# Patient Record
Sex: FEMALE | Race: WHITE | NOT HISPANIC OR LATINO | Employment: STUDENT
[De-identification: names, ages, dates, MRNs, and addresses within clinical notes are randomized per-mention and may not be internally consistent; named-entity substitution may affect disease eponyms.]

---

## 2017-07-15 ENCOUNTER — HEALTH MAINTENANCE LETTER (OUTPATIENT)
Age: 27
End: 2017-07-15

## 2021-02-19 ENCOUNTER — E-VISIT (OUTPATIENT)
Dept: URGENT CARE | Facility: URGENT CARE | Age: 31
End: 2021-02-19
Payer: COMMERCIAL

## 2021-02-19 DIAGNOSIS — Z20.822 ENCOUNTER FOR LABORATORY TESTING FOR COVID-19 VIRUS: Primary | ICD-10-CM

## 2021-02-19 PROCEDURE — 99207 PR NON-BILLABLE SERV PER CHARTING: CPT | Performed by: FAMILY MEDICINE

## 2021-03-10 ENCOUNTER — OFFICE VISIT (OUTPATIENT)
Dept: LAB | Facility: CLINIC | Age: 31
End: 2021-03-10
Attending: FAMILY MEDICINE
Payer: COMMERCIAL

## 2021-03-10 DIAGNOSIS — Z20.822 ENCOUNTER FOR LABORATORY TESTING FOR COVID-19 VIRUS: ICD-10-CM

## 2021-03-10 LAB
SARS-COV-2 RNA RESP QL NAA+PROBE: NORMAL
SPECIMEN SOURCE: NORMAL

## 2021-03-10 PROCEDURE — U0005 INFEC AGEN DETEC AMPLI PROBE: HCPCS | Performed by: FAMILY MEDICINE

## 2021-03-10 PROCEDURE — U0003 INFECTIOUS AGENT DETECTION BY NUCLEIC ACID (DNA OR RNA); SEVERE ACUTE RESPIRATORY SYNDROME CORONAVIRUS 2 (SARS-COV-2) (CORONAVIRUS DISEASE [COVID-19]), AMPLIFIED PROBE TECHNIQUE, MAKING USE OF HIGH THROUGHPUT TECHNOLOGIES AS DESCRIBED BY CMS-2020-01-R: HCPCS | Performed by: FAMILY MEDICINE

## 2021-03-11 LAB
LABORATORY COMMENT REPORT: NORMAL
SARS-COV-2 RNA RESP QL NAA+PROBE: NEGATIVE
SPECIMEN SOURCE: NORMAL

## 2021-04-03 ENCOUNTER — HEALTH MAINTENANCE LETTER (OUTPATIENT)
Age: 31
End: 2021-04-03

## 2021-04-20 NOTE — PROGRESS NOTES
Assessment & Plan     Morbid obesity (H)  Chronic, struggles with with most of her life.  Was started on Wellbutrin and Lexapro in the last year and has lost 25 pounds.  Is eating healthier and more active at current job.  Discussed nutrition, sleep and exercise as part of weight loss.  Also discussed medication management, which patient is interested in.  Will start phentermine and topiramate as well as get baseline laboratory work-up.  Also discussed nutrition referral, patient is agreeable.  Referral placed, should be receiving a phone call to set up.  Will recheck weight and medications in 1 month in office.  - phentermine (ADIPEX-P) 15 MG capsule; Take 1 capsule (15 mg) by mouth every morning  - topiramate (TOPAMAX) 25 MG tablet; Take 1 tablet (25 mg) by mouth daily for 7 days, THEN 2 tablets (50 mg) daily for 24 days.  - CBC with platelets  - TSH with free T4 reflex  - Comprehensive metabolic panel (BMP + Alb, Alk Phos, ALT, AST, Total. Bili, TP)  - Hemoglobin A1c  - SLEEP EVALUATION & MANAGEMENT REFERRAL - [facility] [phone] (Age 13 and up if over 100 lbs); Future  - NUTRITION REFERRAL    Mild major depression (H)  Chronic, just recently in the last year restarted on Wellbutrin and Lexapro.  Has had a symptoms of depression since adolescence.  Distant history of self-harm, none currently.  Doing well on medications.  We will continue without any changes.  - buPROPion (WELLBUTRIN XL) 150 MG 24 hr tablet; Take 1 tablet (150 mg) by mouth every morning  - escitalopram (LEXAPRO) 20 MG tablet; Take 1 tablet (20 mg) by mouth daily    Snoring  History of obesity and snoring.  Father and brother with history of sleep apnea.  Will have patient schedule sleep study.  - SLEEP EVALUATION & MANAGEMENT REFERRAL - [facility] [phone] (Age 13 and up if over 100 lbs); Future    CARDIOVASCULAR SCREENING; LDL GOAL LESS THAN 160  Screening, family history  "of coronary artery disease.  Check lipids today.  - Lipid Profile (Chol, Trig, HDL, LDL calc)             BMI:   Estimated body mass index is 60.6 kg/m  as calculated from the following:    Height as of this encounter: 1.721 m (5' 7.75\").    Weight as of this encounter: 179.4 kg (395 lb 9.6 oz).   Weight management plan: Discussed healthy diet and exercise guidelines Medication management and nutrition     See Patient Instructions    Return in about 1 month (around 2021) for Recheck.    Ivanna Kinney, DALLIN CNP  M [facility]    Phoebe Dennis is a 30 year old who presents for the following health issues     HPI     Concern - weight problem  Onset: since age 11  Description: obesity  Intensity: severe  Progression of Symptoms:  worsening  Accompanying Signs & Symptoms: snoring  Previous history of similar problem: 0  Precipitating factors:        Worsened by: 0  Alleviating factors:        Improved by: 0  Therapies tried and outcome: has not tried medication for weight    Puberty started gaining weight  Struggled with depression during this age as well, self-harm. Was bullied  Was getting treatment with Lexapro for depression during college, and then stopped.   Last year during the onset of COVID felt the depression coming back - through a virtual visit started Lexapro and Wellbutrin and hydroxyzine for sleep. Doing very well on this.   Felt Melatonin wasn't working  Quit job and slept better   New job does have some more activity otherwise not very physically active     Has lost about 25 lbs over the course of the year being on Wellbutrin   Making better choices about getting out of bed   Family history of weight issues  Has decreased bread and carbs in the house and eating better now living with mom and younger brother   Father  of CAD - 2 years ago. Was also obese   Sleeps soundly once to sleep, family states patient snores. Tired during the day         Review of Systems " "  Constitutional, HEENT, cardiovascular, pulmonary, gi and gu systems are negative, except as otherwise noted.      Objective    /76   Pulse 86   Temp 98.6  F (37  C)   Resp 24   Ht 1.721 m (5' 7.75\")   Wt (!) 179.4 kg (395 lb 9.6 oz)   LMP 04/12/2021   SpO2 97%   Breastfeeding No   BMI 60.60 kg/m    Body mass index is 60.6 kg/m .  Physical Exam   GENERAL APPEARANCE: healthy, alert and no distress  RESP: lungs clear to auscultation - no rales, rhonchi or wheezes  CV: regular rates and rhythm, normal S1 S2, no S3 or S4 and no murmur, click or rub  ABDOMEN: soft, nontender, without hepatosplenomegaly or masses, bowel sounds normal and obese  MS: extremities normal- no gross deformities noted  SKIN: no suspicious lesions or rashes  NEURO: Normal strength and tone, mentation intact and speech normal  PSYCH: mentation appears normal and affect normal/bright    Diagnostic Test Results:  Pending            "

## 2021-04-21 ENCOUNTER — OFFICE VISIT (OUTPATIENT)
Dept: FAMILY MEDICINE | Facility: CLINIC | Age: 31
End: 2021-04-21
Payer: COMMERCIAL

## 2021-04-21 VITALS
RESPIRATION RATE: 24 BRPM | OXYGEN SATURATION: 97 % | SYSTOLIC BLOOD PRESSURE: 126 MMHG | BODY MASS INDEX: 44.41 KG/M2 | HEIGHT: 68 IN | TEMPERATURE: 98.6 F | DIASTOLIC BLOOD PRESSURE: 76 MMHG | HEART RATE: 86 BPM | WEIGHT: 293 LBS

## 2021-04-21 DIAGNOSIS — R06.83 SNORING: ICD-10-CM

## 2021-04-21 DIAGNOSIS — F32.0 MILD MAJOR DEPRESSION (H): ICD-10-CM

## 2021-04-21 DIAGNOSIS — E66.01 MORBID OBESITY (H): Primary | ICD-10-CM

## 2021-04-21 DIAGNOSIS — Z13.6 CARDIOVASCULAR SCREENING; LDL GOAL LESS THAN 160: ICD-10-CM

## 2021-04-21 LAB
ALBUMIN SERPL-MCNC: 3.5 G/DL (ref 3.4–5)
ALP SERPL-CCNC: 95 U/L (ref 40–150)
ALT SERPL W P-5'-P-CCNC: 23 U/L (ref 0–50)
ANION GAP SERPL CALCULATED.3IONS-SCNC: 5 MMOL/L (ref 3–14)
AST SERPL W P-5'-P-CCNC: 11 U/L (ref 0–45)
BILIRUB SERPL-MCNC: 1.1 MG/DL (ref 0.2–1.3)
BUN SERPL-MCNC: 12 MG/DL (ref 7–30)
CALCIUM SERPL-MCNC: 8.6 MG/DL (ref 8.5–10.1)
CHLORIDE SERPL-SCNC: 104 MMOL/L (ref 94–109)
CHOLEST SERPL-MCNC: 146 MG/DL
CO2 SERPL-SCNC: 27 MMOL/L (ref 20–32)
CREAT SERPL-MCNC: 0.97 MG/DL (ref 0.52–1.04)
ERYTHROCYTE [DISTWIDTH] IN BLOOD BY AUTOMATED COUNT: 14.2 % (ref 10–15)
GFR SERPL CREATININE-BSD FRML MDRD: 77 ML/MIN/{1.73_M2}
GLUCOSE SERPL-MCNC: 92 MG/DL (ref 70–99)
HBA1C MFR BLD: 5.3 % (ref 0–5.6)
HCT VFR BLD AUTO: 40.3 % (ref 35–47)
HDLC SERPL-MCNC: 39 MG/DL
HGB BLD-MCNC: 13 G/DL (ref 11.7–15.7)
LDLC SERPL CALC-MCNC: 88 MG/DL
MCH RBC QN AUTO: 28.5 PG (ref 26.5–33)
MCHC RBC AUTO-ENTMCNC: 32.3 G/DL (ref 31.5–36.5)
MCV RBC AUTO: 88 FL (ref 78–100)
NONHDLC SERPL-MCNC: 107 MG/DL
PLATELET # BLD AUTO: 367 10E9/L (ref 150–450)
POTASSIUM SERPL-SCNC: 4.1 MMOL/L (ref 3.4–5.3)
PROT SERPL-MCNC: 7.5 G/DL (ref 6.8–8.8)
RBC # BLD AUTO: 4.56 10E12/L (ref 3.8–5.2)
SODIUM SERPL-SCNC: 136 MMOL/L (ref 133–144)
TRIGL SERPL-MCNC: 94 MG/DL
TSH SERPL DL<=0.005 MIU/L-ACNC: 0.77 MU/L (ref 0.4–4)
WBC # BLD AUTO: 9.7 10E9/L (ref 4–11)

## 2021-04-21 PROCEDURE — 80061 LIPID PANEL: CPT | Performed by: NURSE PRACTITIONER

## 2021-04-21 PROCEDURE — 85027 COMPLETE CBC AUTOMATED: CPT | Performed by: NURSE PRACTITIONER

## 2021-04-21 PROCEDURE — 83036 HEMOGLOBIN GLYCOSYLATED A1C: CPT | Performed by: NURSE PRACTITIONER

## 2021-04-21 PROCEDURE — 84443 ASSAY THYROID STIM HORMONE: CPT | Performed by: NURSE PRACTITIONER

## 2021-04-21 PROCEDURE — 36415 COLL VENOUS BLD VENIPUNCTURE: CPT | Performed by: NURSE PRACTITIONER

## 2021-04-21 PROCEDURE — 80053 COMPREHEN METABOLIC PANEL: CPT | Performed by: NURSE PRACTITIONER

## 2021-04-21 PROCEDURE — 99204 OFFICE O/P NEW MOD 45 MIN: CPT | Performed by: NURSE PRACTITIONER

## 2021-04-21 RX ORDER — ESCITALOPRAM OXALATE 20 MG/1
20 TABLET ORAL DAILY
Qty: 90 TABLET | Refills: 3 | Status: SHIPPED | OUTPATIENT
Start: 2021-04-21 | End: 2022-06-08

## 2021-04-21 RX ORDER — ESCITALOPRAM OXALATE 20 MG/1
20 TABLET ORAL DAILY
COMMUNITY
Start: 2021-03-31 | End: 2021-04-21

## 2021-04-21 RX ORDER — BUPROPION HYDROCHLORIDE 150 MG/1
150 TABLET ORAL EVERY MORNING
Qty: 90 TABLET | Refills: 3 | Status: SHIPPED | OUTPATIENT
Start: 2021-04-21 | End: 2022-04-27

## 2021-04-21 RX ORDER — TOPIRAMATE 25 MG/1
TABLET, FILM COATED ORAL
Qty: 55 TABLET | Refills: 0 | Status: SHIPPED | OUTPATIENT
Start: 2021-04-21 | End: 2021-05-21

## 2021-04-21 RX ORDER — PHENTERMINE HYDROCHLORIDE 15 MG/1
15 CAPSULE ORAL EVERY MORNING
Qty: 31 CAPSULE | Refills: 0 | Status: SHIPPED | OUTPATIENT
Start: 2021-04-21 | End: 2021-05-21

## 2021-04-21 RX ORDER — BUPROPION HYDROCHLORIDE 150 MG/1
TABLET ORAL DAILY
COMMUNITY
Start: 2020-06-01 | End: 2021-04-21

## 2021-04-21 ASSESSMENT — MIFFLIN-ST. JEOR: SCORE: 2548.96

## 2021-04-21 NOTE — PATIENT INSTRUCTIONS
Morbid obesity (H)  Chronic, struggles with with most of her life.  Was started on Wellbutrin and Lexapro in the last year and has lost 25 pounds.  Is eating healthier and more active at current job.  Discussed nutrition, sleep and exercise as part of weight loss.  Also discussed medication management, which patient is interested in.  Will start phentermine and topiramate as well as get baseline laboratory work-up.  Also discussed nutrition referral, patient is agreeable.  Referral placed, should be receiving a phone call to set up.  Will recheck weight and medications in 1 month in office.  - phentermine (ADIPEX-P) 15 MG capsule; Take 1 capsule (15 mg) by mouth every morning  - topiramate (TOPAMAX) 25 MG tablet; Take 1 tablet (25 mg) by mouth daily for 7 days, THEN 2 tablets (50 mg) daily for 24 days.  - CBC with platelets  - TSH with free T4 reflex  - Comprehensive metabolic panel (BMP + Alb, Alk Phos, ALT, AST, Total. Bili, TP)  - Hemoglobin A1c  - SLEEP EVALUATION & MANAGEMENT REFERRAL - [facility] [phone] (Age 13 and up if over 100 lbs); Future  - NUTRITION REFERRAL    Mild major depression (H)  Chronic, just recently in the last year restarted on Wellbutrin and Lexapro.  Has had a symptoms of depression since adolescence.  Distant history of self-harm, none currently.  Doing well on medications.  We will continue without any changes.  - buPROPion (WELLBUTRIN XL) 150 MG 24 hr tablet; Take 1 tablet (150 mg) by mouth every morning  - escitalopram (LEXAPRO) 20 MG tablet; Take 1 tablet (20 mg) by mouth daily    Snoring  History of obesity and snoring.  Father and brother with history of sleep apnea.  Will have patient schedule sleep study.  - SLEEP EVALUATION & MANAGEMENT REFERRAL - [facility] [phone] (Age 13 and up if over 100 lbs); Future    CARDIOVASCULAR SCREENING; LDL GOAL LESS THAN 160  Screening, family history of coronary artery  disease.  Check lipids today.  - Lipid Profile (Chol, Trig, HDL, LDL calc)

## 2021-05-21 ENCOUNTER — OFFICE VISIT (OUTPATIENT)
Dept: FAMILY MEDICINE | Facility: CLINIC | Age: 31
End: 2021-05-21
Payer: COMMERCIAL

## 2021-05-21 VITALS
WEIGHT: 293 LBS | SYSTOLIC BLOOD PRESSURE: 136 MMHG | TEMPERATURE: 98.9 F | RESPIRATION RATE: 30 BRPM | OXYGEN SATURATION: 98 % | DIASTOLIC BLOOD PRESSURE: 82 MMHG | HEART RATE: 86 BPM | BODY MASS INDEX: 58.36 KG/M2

## 2021-05-21 DIAGNOSIS — Z00.00 ROUTINE GENERAL MEDICAL EXAMINATION AT A HEALTH CARE FACILITY: Primary | ICD-10-CM

## 2021-05-21 DIAGNOSIS — E66.01 MORBID OBESITY (H): ICD-10-CM

## 2021-05-21 PROCEDURE — 99395 PREV VISIT EST AGE 18-39: CPT | Performed by: NURSE PRACTITIONER

## 2021-05-21 PROCEDURE — G0145 SCR C/V CYTO,THINLAYER,RESCR: HCPCS | Performed by: NURSE PRACTITIONER

## 2021-05-21 PROCEDURE — 87624 HPV HI-RISK TYP POOLED RSLT: CPT | Performed by: NURSE PRACTITIONER

## 2021-05-21 RX ORDER — TOPIRAMATE 50 MG/1
50 TABLET, FILM COATED ORAL DAILY
Qty: 60 TABLET | Refills: 0 | Status: SHIPPED | OUTPATIENT
Start: 2021-05-21 | End: 2021-06-30

## 2021-05-21 RX ORDER — PHENTERMINE HYDROCHLORIDE 15 MG/1
15 CAPSULE ORAL EVERY MORNING
Qty: 31 CAPSULE | Refills: 0 | Status: SHIPPED | OUTPATIENT
Start: 2021-05-21 | End: 2021-06-30

## 2021-05-21 ASSESSMENT — ENCOUNTER SYMPTOMS
BREAST MASS: 0
FEVER: 0
CHILLS: 0
DIZZINESS: 0
MYALGIAS: 0
PALPITATIONS: 0
FREQUENCY: 0
SORE THROAT: 0
JOINT SWELLING: 0
ARTHRALGIAS: 0
PARESTHESIAS: 0
DIARRHEA: 0
WEAKNESS: 0
ABDOMINAL PAIN: 0
DYSURIA: 0
HEMATURIA: 0
EYE PAIN: 0
HEARTBURN: 0
NERVOUS/ANXIOUS: 0
CONSTIPATION: 0
HEADACHES: 0
SHORTNESS OF BREATH: 0
HEMATOCHEZIA: 0
NAUSEA: 0
COUGH: 0

## 2021-05-21 ASSESSMENT — PATIENT HEALTH QUESTIONNAIRE - PHQ9
SUM OF ALL RESPONSES TO PHQ QUESTIONS 1-9: 2
SUM OF ALL RESPONSES TO PHQ QUESTIONS 1-9: 2

## 2021-05-21 NOTE — PATIENT INSTRUCTIONS
1. Routine general medical examination at a health care facility    - Pap imaged thin layer screen with HPV - recommended age 30 - 65  - HPV High Risk Types DNA Cervical    2. Morbid obesity (H)  Chronic, started on Phentermine and Topiramate 1 month ago. Down 15 lbs. Doing well. Meets with dietician on June 9th. Hasn't started any exercise program yet, would encourage to start something, even if small walks. Follow-up in 1 month for recheck.   - phentermine (ADIPEX-P) 15 MG capsule; Take 1 capsule (15 mg) by mouth every morning  Dispense: 31 capsule; Refill: 0  - topiramate (TOPAMAX) 50 MG tablet; Take 1 tablet (50 mg) by mouth daily  Dispense: 60 tablet; Refill: 0        Preventive Health Recommendations  Female Ages 26 - 39  Yearly exam:   See your health care provider every year in order to    Review health changes.     Discuss preventive care.      Review your medicines if you your doctor has prescribed any.    Until age 30: Get a Pap test every three years (more often if you have had an abnormal result).    After age 30: Talk to your doctor about whether you should have a Pap test every 3 years or have a Pap test with HPV screening every 5 years.   You do not need a Pap test if your uterus was removed (hysterectomy) and you have not had cancer.  You should be tested each year for STDs (sexually transmitted diseases), if you're at risk.   Talk to your provider about how often to have your cholesterol checked.  If you are at risk for diabetes, you should have a diabetes test (fasting glucose).  Shots: Get a flu shot each year. Get a tetanus shot every 10 years.   Nutrition:     Eat at least 5 servings of fruits and vegetables each day.    Eat whole-grain bread, whole-wheat pasta and brown rice instead of white grains and rice.    Get adequate Calcium and Vitamin D.     Lifestyle    Exercise at least 150 minutes a week (30 minutes a day, 5 days of the week). This will help you control your weight and prevent  disease.    Limit alcohol to one drink per day.    No smoking.     Wear sunscreen to prevent skin cancer.    See your dentist every six months for an exam and cleaning.

## 2021-05-21 NOTE — PROGRESS NOTES
SUBJECTIVE:   CC: Jeannie Russo is an 30 year old woman who presents for preventive health visit.       Patient has been advised of split billing requirements and indicates understanding: Yes  Healthy Habits:     Getting at least 3 servings of Calcium per day:  Yes    Bi-annual eye exam:  Yes    Dental care twice a year:  NO    Sleep apnea or symptoms of sleep apnea:  None    Diet:  Carbohydrate counting    Frequency of exercise:  None    Taking medications regularly:  Yes    Medication side effects:  Lightheadedness    PHQ-2 Total Score: 0    Additional concerns today:  No      Weight Loss    On Phentermine and Topamax - tolerating well  Had difficulty with evening binging -  Doing very well with this.     Wt Readings from Last 3 Encounters:   05/21/21 (!) 172.8 kg (381 lb)   04/21/21 (!) 179.4 kg (395 lb 9.6 oz)   05/26/06 102.5 kg (225 lb 14.4 oz) (99 %, Z= 2.27)*     * Growth percentiles are based on [facility] (Girls, 2-20 Years) data.          Today's PHQ-2 Score:   PHQ-2 ( 1999 [employer]) 5/21/2021   Q1: Little interest or pleasure in doing things 0   Q2: Feeling down, depressed or hopeless 0   PHQ-2 Score 0   Q1: Little interest or pleasure in doing things Not at all   Q2: Feeling down, depressed or hopeless Not at all   PHQ-2 Score 0       Abuse: Current or Past (Physical, Sexual or Emotional) - No  Do you feel safe in your environment? Yes    Have you ever done Advance Care Planning? (For example, a Health Directive, POLST, or a discussion with a medical provider or your loved ones about your wishes): No, advance care planning information given to patient to review.  Patient declined advance care planning discussion at this time.    Social History     Tobacco Use     Smoking status: Never Smoker     Smokeless tobacco: Never Used   Substance Use Topics     Alcohol use: No         Alcohol Use 5/21/2021   Prescreen: >3 drinks/day or >7 drinks/week? No       Reviewed orders with patient.  Reviewed health  maintenance and updated orders accordingly - Yes  Lab work is in process  Labs reviewed in EPIC  BP Readings from Last 3 Encounters:   05/21/21 136/82   04/21/21 126/76   05/26/06 133/69 (98 %, Z = 2.01 /  60 %, Z = 0.24)*     *BP percentiles are based on the 2017 AAP Clinical Practice Guideline for girls    Wt Readings from Last 3 Encounters:   05/21/21 (!) 172.8 kg (381 lb)   04/21/21 (!) 179.4 kg (395 lb 9.6 oz)   05/26/06 102.5 kg (225 lb 14.4 oz) (99 %, Z= 2.27)*     * Growth percentiles are based on [facility] (Girls, 2-20 Years) data.                  Patient Active Problem List   Diagnosis     Morbid obesity (H)     Mild major depression (H)     Past Surgical History:   Procedure Laterality Date     SURGICAL HISTORY OF -       None       Social History     Tobacco Use     Smoking status: Never Smoker     Smokeless tobacco: Never Used   Substance Use Topics     Alcohol use: No     Family History   Problem Relation Age of Onset     C.A.D. Father      Diabetes Father      Hypertension Father      C.A.D. Paternal Grandfather      Breast Cancer Other      Breast Cancer Other      Musculoskeletal Disorder Other         MS         Current Outpatient Medications   Medication Sig Dispense Refill     buPROPion (WELLBUTRIN XL) 150 MG 24 hr tablet Take 1 tablet (150 mg) by mouth every morning 90 tablet 3     escitalopram (LEXAPRO) 20 MG tablet Take 1 tablet (20 mg) by mouth daily 90 tablet 3     phentermine (ADIPEX-P) 15 MG capsule Take 1 capsule (15 mg) by mouth every morning 31 capsule 0     topiramate (TOPAMAX) 50 MG tablet Take 1 tablet (50 mg) by mouth daily 60 tablet 0     No Known Allergies    Breast Cancer Screening:    Breast CA Risk Assessment (FHS-7) 5/21/2021   Do you have a family history of breast, colon, or ovarian cancer? No / Unknown       Patient under 40 years of age: Routine Mammogram Screening not recommended.   Pertinent mammograms are reviewed under the imaging tab.    History of abnormal Pap smear: Has  never had PAP      Reviewed and updated as needed this visit by clinical staff  Tobacco   Meds              Reviewed and updated as needed this visit by Provider    Meds             No past medical history on file.   Past Surgical History:   Procedure Laterality Date     SURGICAL HISTORY OF -       None       Review of Systems   Constitutional: Negative for chills and fever.   HENT: Negative for congestion, ear pain, hearing loss and sore throat.    Eyes: Negative for pain and visual disturbance.   Respiratory: Negative for cough and shortness of breath.    Cardiovascular: Negative for chest pain, palpitations and peripheral edema.   Gastrointestinal: Negative for abdominal pain, constipation, diarrhea, heartburn, hematochezia and nausea.   Breasts:  Negative for tenderness, breast mass and discharge.   Genitourinary: Negative for dysuria, frequency, genital sores, hematuria, pelvic pain, urgency, vaginal bleeding and vaginal discharge.   Musculoskeletal: Negative for arthralgias, joint swelling and myalgias.   Skin: Negative for rash.   Neurological: Negative for dizziness, weakness, headaches and paresthesias.   Psychiatric/Behavioral: Negative for mood changes. The patient is not nervous/anxious.         OBJECTIVE:   /82   Pulse 86   Temp 98.9  F (37.2  C)   Resp 30   Wt (!) 172.8 kg (381 lb)   LMP 05/17/2021   SpO2 98%   Breastfeeding No   BMI 58.36 kg/m    Physical Exam  GENERAL: healthy, alert and no distress  EYES: Eyes grossly normal to inspection, PERRL and conjunctivae and sclerae normal  HENT: ear canals and TM's normal, nose and mouth without ulcers or lesions  NECK: no adenopathy, no asymmetry, masses, or scars and thyroid normal to palpation  RESP: lungs clear to auscultation - no rales, rhonchi or wheezes  BREAST: normal without masses, tenderness or nipple discharge and no palpable axillary masses or adenopathy  CV: regular rate and rhythm, normal S1 S2, no S3 or S4, no murmur, click  "or rub, no peripheral edema and peripheral pulses strong  ABDOMEN: soft, nontender, no hepatosplenomegaly, no masses and bowel sounds normal  ABDOMEN: soft, nontender, without hepatosplenomegaly or masses, bowel sounds normal and obese  MS: no gross musculoskeletal defects noted, no edema  SKIN: no suspicious lesions or rashes  NEURO: Normal strength and tone, mentation intact and speech normal  PSYCH: mentation appears normal, affect normal/bright    Diagnostic Test Results:  Labs reviewed in Epic  Pending     ASSESSMENT/PLAN:   1. Routine general medical examination at a health care facility    - Pap imaged thin layer screen with HPV - recommended age 30 - 65  - HPV High Risk Types DNA Cervical    2. Morbid obesity (H)  Chronic, started on Phentermine and Topiramate 1 month ago. Down 15 lbs. Doing well. Meets with dietician on June 9th. Hasn't started any exercise program yet, would encourage to start something, even if small walks. Follow-up in 1 month for recheck.   - phentermine (ADIPEX-P) 15 MG capsule; Take 1 capsule (15 mg) by mouth every morning  Dispense: 31 capsule; Refill: 0  - topiramate (TOPAMAX) 50 MG tablet; Take 1 tablet (50 mg) by mouth daily  Dispense: 60 tablet; Refill: 0    Patient has been advised of split billing requirements and indicates understanding: Yes  COUNSELING:  Reviewed preventive health counseling, as reflected in patient instructions    Estimated body mass index is 58.36 kg/m  as calculated from the following:    Height as of 4/21/21: 1.721 m (5' 7.75\").    Weight as of this encounter: 172.8 kg (381 lb).    Weight management plan: Discussed healthy diet and exercise guidelines Patient also on medication management for weight loss    She reports that she has never smoked. She has never used smokeless tobacco.      Counseling Resources:  ATP IV Guidelines  Pooled Cohorts Equation Calculator  Breast Cancer Risk Calculator  BRCA-Related Cancer Risk Assessment: FHS-7 Tool  FRAX Risk " Assessment  ICSI Preventive Guidelines  Dietary Guidelines for Americans, 2010  USDA's MyPlate  ASA Prophylaxis  Lung CA Screening    DALLIN Richter CNP  M [facility]

## 2021-05-26 LAB
COPATH REPORT: NORMAL
PAP: NORMAL

## 2021-05-27 LAB
FINAL DIAGNOSIS: NORMAL
HPV HR 12 DNA CVX QL NAA+PROBE: NEGATIVE
HPV16 DNA SPEC QL NAA+PROBE: NEGATIVE
HPV18 DNA SPEC QL NAA+PROBE: NEGATIVE
SPECIMEN DESCRIPTION: NORMAL
SPECIMEN SOURCE CVX/VAG CYTO: NORMAL

## 2021-06-30 ENCOUNTER — OFFICE VISIT (OUTPATIENT)
Dept: FAMILY MEDICINE | Facility: CLINIC | Age: 31
End: 2021-06-30
Payer: COMMERCIAL

## 2021-06-30 VITALS
DIASTOLIC BLOOD PRESSURE: 80 MMHG | WEIGHT: 293 LBS | HEART RATE: 91 BPM | BODY MASS INDEX: 44.41 KG/M2 | RESPIRATION RATE: 14 BRPM | SYSTOLIC BLOOD PRESSURE: 128 MMHG | HEIGHT: 68 IN | OXYGEN SATURATION: 99 %

## 2021-06-30 DIAGNOSIS — E66.01 MORBID OBESITY (H): ICD-10-CM

## 2021-06-30 PROCEDURE — 99213 OFFICE O/P EST LOW 20 MIN: CPT | Performed by: NURSE PRACTITIONER

## 2021-06-30 RX ORDER — PHENTERMINE HYDROCHLORIDE 15 MG/1
15 CAPSULE ORAL EVERY MORNING
Qty: 31 CAPSULE | Refills: 0 | Status: SHIPPED | OUTPATIENT
Start: 2021-06-30 | End: 2021-07-21 | Stop reason: DRUGHIGH

## 2021-06-30 RX ORDER — TOPIRAMATE 50 MG/1
50 TABLET, FILM COATED ORAL DAILY
Qty: 30 TABLET | Refills: 0 | Status: SHIPPED | OUTPATIENT
Start: 2021-06-30 | End: 2021-07-21

## 2021-06-30 ASSESSMENT — MIFFLIN-ST. JEOR: SCORE: 2455.52

## 2021-06-30 NOTE — PROGRESS NOTES
"    Assessment & Plan     Morbid obesity (H)  Chronic, started phentermine and topiramate a little over 2 months ago.  Doing very well with weight loss, down 6 pounds since last office visit, total weight loss since start = 20 pounds.  Very happy with progress, keep up the good work.  Patient notes decrease in appetite.  Is joining the gym, and will be working on increasing physical activity.  Encouraged to continue a healthy diet and exercise and will have patient return to clinic in 1 month for recheck.  - phentermine (ADIPEX-P) 15 MG capsule; Take 1 capsule (15 mg) by mouth every morning  - topiramate (TOPAMAX) 50 MG tablet; Take 1 tablet (50 mg) by mouth daily           See Patient Instructions    Return in about 1 month (around 7/30/2021) for Recheck.    Ivanna Kinney, DALLIN [facility]    Phoebe Dennis is a 30 year old who presents for the following health issues:    HPI     Medication Followup of Weight (Phentermine/Topamax    Taking Medication as prescribed: yes    Side Effects:  None    Medication Helping Symptoms:  yes     Doing very well   Doesn't think about food as much, or what she's going to eat   Last couple of weeks has been traveling - lots of driving       Wt Readings from Last 4 Encounters:   06/30/21 (!) 170.1 kg (375 lb)   05/21/21 (!) 172.8 kg (381 lb)   04/21/21 (!) 179.4 kg (395 lb 9.6 oz)   05/26/06 102.5 kg (225 lb 14.4 oz) (99 %, Z= 2.27)*     * Growth percentiles are based on CDC (Girls, 2-20 Years) data.           Review of Systems   Constitutional, HEENT, cardiovascular, pulmonary, gi and gu systems are negative, except as otherwise noted.      Objective    /80 (BP Location: Right arm, Patient Position: Chair, Cuff Size: Adult Large)   Pulse 91   Resp 14   Ht 1.721 m (5' 7.75\")   Wt (!) 170.1 kg (375 lb)   SpO2 99%   BMI 57.44 kg/m    Body mass index is 57.44 kg/m .  Physical Exam   GENERAL APPEARANCE: healthy, alert and no " distress  RESP: lungs clear to auscultation - no rales, rhonchi or wheezes  CV: regular rates and rhythm, normal S1 S2, no S3 or S4 and no murmur, click or rub  ABDOMEN: soft, nontender, without hepatosplenomegaly or masses, bowel sounds normal and obese  NEURO: Normal strength and tone, mentation intact and speech normal  PSYCH: mentation appears normal and affect normal/bright    Diagnostic Test Results:  none

## 2021-06-30 NOTE — PATIENT INSTRUCTIONS
Morbid obesity (H)  Chronic, started phentermine and topiramate a little over 2 months ago.  Doing very well with weight loss, down 6 pounds since last office visit, total weight loss since start = 20 pounds.  Very happy with progress, keep up the good work.  Patient notes decrease in appetite.  Is joining the gym, and will be working on increasing physical activity.  Encouraged to continue a healthy diet and exercise and will have patient return to clinic in 1 month for recheck.  - phentermine (ADIPEX-P) 15 MG capsule; Take 1 capsule (15 mg) by mouth every morning  - topiramate (TOPAMAX) 50 MG tablet; Take 1 tablet (50 mg) by mouth daily

## 2021-07-21 ENCOUNTER — OFFICE VISIT (OUTPATIENT)
Dept: FAMILY MEDICINE | Facility: CLINIC | Age: 31
End: 2021-07-21
Payer: COMMERCIAL

## 2021-07-21 VITALS
SYSTOLIC BLOOD PRESSURE: 106 MMHG | OXYGEN SATURATION: 98 % | TEMPERATURE: 98.8 F | RESPIRATION RATE: 30 BRPM | DIASTOLIC BLOOD PRESSURE: 66 MMHG | WEIGHT: 293 LBS | BODY MASS INDEX: 56.77 KG/M2 | HEART RATE: 86 BPM

## 2021-07-21 DIAGNOSIS — E66.01 MORBID OBESITY (H): ICD-10-CM

## 2021-07-21 DIAGNOSIS — Z80.8 FAMILY HISTORY OF SKIN CANCER: Primary | ICD-10-CM

## 2021-07-21 PROCEDURE — 99213 OFFICE O/P EST LOW 20 MIN: CPT | Performed by: NURSE PRACTITIONER

## 2021-07-21 RX ORDER — TOPIRAMATE 50 MG/1
50 TABLET, FILM COATED ORAL 2 TIMES DAILY
Qty: 60 TABLET | Refills: 0 | Status: SHIPPED | OUTPATIENT
Start: 2021-07-21 | End: 2021-08-30

## 2021-07-21 RX ORDER — PHENTERMINE HYDROCHLORIDE 15 MG/1
15 CAPSULE ORAL EVERY MORNING
Qty: 31 CAPSULE | Refills: 0 | Status: CANCELLED | OUTPATIENT
Start: 2021-07-21

## 2021-07-21 RX ORDER — PHENTERMINE HYDROCHLORIDE 37.5 MG/1
37.5 TABLET ORAL
Qty: 30 TABLET | Refills: 0 | Status: SHIPPED | OUTPATIENT
Start: 2021-07-21 | End: 2021-08-30

## 2021-07-21 RX ORDER — TOPIRAMATE 50 MG/1
50 TABLET, FILM COATED ORAL DAILY
Qty: 30 TABLET | Refills: 0 | Status: CANCELLED | OUTPATIENT
Start: 2021-07-21

## 2021-07-21 NOTE — PROGRESS NOTES
Assessment & Plan     Morbid obesity (H)  Chronic, improving.  Down another 5 pounds since last office visit!  Congratulated patient!  Started NOOM, and the gym.  Doing very well.  Noticing appetite decrease not as good as it was.  Discussed increasing phentermine and topiramate, patient agreeable.  Will increase both and follow-up in 1 month in clinic.  Continue good nutritious diet and exercise.  - phentermine (ADIPEX-P) 37.5 MG tablet; Take 1 tablet (37.5 mg) by mouth every morning (before breakfast)  - topiramate (TOPAMAX) 50 MG tablet; Take 1 tablet (50 mg) by mouth 2 times daily    Family history of skin cancer  Mom has history of skin cancer, patient unsure which type.  Recommend full body skin check by dermatology.  Referral placed.  - Adult Dermatology Referral; Future             See Patient Instructions    Return in about 1 month (around 8/21/2021) for Recheck.    DALLIN Richter CNP  M [facility]    Phoebe Dennis is a 30 year old who presents for the following health issues     HPI     Medication Followup of phentermine and topamax    Taking Medication as prescribed: yes    Side Effects:  None    Medication Helping Symptoms:  yes     Wt Readings from Last 4 Encounters:   07/21/21 (!) 168.1 kg (370 lb 9.6 oz)   06/30/21 (!) 170.1 kg (375 lb)   05/21/21 (!) 172.8 kg (381 lb)   04/21/21 (!) 179.4 kg (395 lb 9.6 oz)     Did join gym -doing well with this  Started Noom 8 days ago  Appetite decrease not as much as it was at the beginning, eating nutritious foods        Review of Systems   Constitutional, HEENT, cardiovascular, pulmonary, gi and gu systems are negative, except as otherwise noted.      Objective    /66   Pulse 86   Temp 98.8  F (37.1  C)   Resp 30   Wt (!) 168.1 kg (370 lb 9.6 oz)   LMP 07/12/2021   SpO2 98%   Breastfeeding No   BMI 56.77 kg/m    Body mass index is 56.77 kg/m .  Physical Exam   GENERAL APPEARANCE: healthy, alert and no  distress  RESP: lungs clear to auscultation - no rales, rhonchi or wheezes  CV: regular rates and rhythm, normal S1 S2, no S3 or S4 and no murmur, click or rub  ABDOMEN: soft, nontender, without hepatosplenomegaly or masses and bowel sounds normal and obese  MS: extremities normal- no gross deformities noted  SKIN: no suspicious lesions or rashes  NEURO: Normal strength and tone, mentation intact and speech normal  PSYCH: mentation appears normal and affect normal/bright    Diagnostic Test Results:  none          Chart documentation with Dragon Voice recognition Software. Although reviewed after completion, some words and grammatical errors may remain.

## 2021-07-21 NOTE — PATIENT INSTRUCTIONS
Morbid obesity (H)  Chronic, improving.  Down another 5 pounds since last office visit!  Congratulated patient!  Started NOOM, and the gym.  Doing very well.  Noticing appetite decrease not as good as it was.  Discussed increasing phentermine and topiramate, patient agreeable.  Will increase both and follow-up in 1 month in clinic.  Continue good nutritious diet and exercise.  - phentermine (ADIPEX-P) 37.5 MG tablet; Take 1 tablet (37.5 mg) by mouth every morning (before breakfast)  - topiramate (TOPAMAX) 50 MG tablet; Take 1 tablet (50 mg) by mouth 2 times daily    Family history of skin cancer  Mom has history of skin cancer, patient unsure which type.  Recommend full body skin check by dermatology.  Referral placed.  - Adult Dermatology Referral; Future

## 2021-08-13 ENCOUNTER — MYC MEDICAL ADVICE (OUTPATIENT)
Dept: SLEEP MEDICINE | Facility: CLINIC | Age: 31
End: 2021-08-13

## 2021-08-13 ASSESSMENT — ENCOUNTER SYMPTOMS
INCREASED ENERGY: 0
FATIGUE: 1
DECREASED APPETITE: 1
FEVER: 0
POLYPHAGIA: 0
HALLUCINATIONS: 0
WEIGHT GAIN: 0
ALTERED TEMPERATURE REGULATION: 0
WEIGHT LOSS: 1
NIGHT SWEATS: 0
CHILLS: 0
POLYDIPSIA: 0

## 2021-08-13 ASSESSMENT — SLEEP AND FATIGUE QUESTIONNAIRES
HOW LIKELY ARE YOU TO NOD OFF OR FALL ASLEEP WHILE SITTING INACTIVE IN A PUBLIC PLACE: SLIGHT CHANCE OF DOZING
HOW LIKELY ARE YOU TO NOD OFF OR FALL ASLEEP IN A CAR, WHILE STOPPED FOR A FEW MINUTES IN TRAFFIC: WOULD NEVER DOZE
HOW LIKELY ARE YOU TO NOD OFF OR FALL ASLEEP WHILE SITTING AND READING: HIGH CHANCE OF DOZING
HOW LIKELY ARE YOU TO NOD OFF OR FALL ASLEEP WHILE SITTING QUIETLY AFTER LUNCH WITHOUT ALCOHOL: MODERATE CHANCE OF DOZING
HOW LIKELY ARE YOU TO NOD OFF OR FALL ASLEEP WHILE WATCHING TV: HIGH CHANCE OF DOZING
HOW LIKELY ARE YOU TO NOD OFF OR FALL ASLEEP WHILE LYING DOWN TO REST IN THE AFTERNOON WHEN CIRCUMSTANCES PERMIT: HIGH CHANCE OF DOZING
HOW LIKELY ARE YOU TO NOD OFF OR FALL ASLEEP WHILE SITTING AND TALKING TO SOMEONE: SLIGHT CHANCE OF DOZING
HOW LIKELY ARE YOU TO NOD OFF OR FALL ASLEEP WHEN YOU ARE A PASSENGER IN A CAR FOR AN HOUR WITHOUT A BREAK: SLIGHT CHANCE OF DOZING

## 2021-08-17 ENCOUNTER — VIRTUAL VISIT (OUTPATIENT)
Dept: SLEEP MEDICINE | Facility: CLINIC | Age: 31
End: 2021-08-17
Attending: NURSE PRACTITIONER
Payer: COMMERCIAL

## 2021-08-17 DIAGNOSIS — R06.81 WITNESSED EPISODE OF APNEA: Primary | ICD-10-CM

## 2021-08-17 DIAGNOSIS — E66.01 MORBID OBESITY (H): ICD-10-CM

## 2021-08-17 DIAGNOSIS — R06.83 SNORING: ICD-10-CM

## 2021-08-17 DIAGNOSIS — R53.82 CHRONIC FATIGUE: ICD-10-CM

## 2021-08-17 PROCEDURE — 99203 OFFICE O/P NEW LOW 30 MIN: CPT | Mod: GT | Performed by: INTERNAL MEDICINE

## 2021-08-17 NOTE — PATIENT INSTRUCTIONS
Your BMI is There is no height or weight on file to calculate BMI.  Weight management is a personal decision.  If you are interested in exploring weight loss strategies, the following discussion covers the approaches that may be successful. Body mass index (BMI) is one way to tell whether you are at a healthy weight, overweight, or obese. It measures your weight in relation to your height.  A BMI of 18.5 to 24.9 is in the healthy range. A person with a BMI of 25 to 29.9 is considered overweight, and someone with a BMI of 30 or greater is considered obese. More than two-thirds of American adults are considered overweight or obese.  Being overweight or obese increases the risk for further weight gain. Excess weight may lead to heart disease and diabetes.  Creating and following plans for healthy eating and physical activity may help you improve your health.  Weight control is part of healthy lifestyle and includes exercise, emotional health, and healthy eating habits. Careful eating habits lifelong are the mainstay of weight control. Though there are significant health benefits from weight loss, long-term weight loss with diet alone may be very difficult to achieve- studies show long-term success with dietary management in less than 10% of people. Attaining a healthy weight may be especially difficult to achieve in those with severe obesity. In some cases, medications, devices and surgical management might be considered.  What can you do?  If you are overweight or obese and are interested in methods for weight loss, you should discuss this with your provider.     Consider reducing daily calorie intake by 500 calories.     Keep a food journal.     Avoiding skipping meals, consider cutting portions instead.    Diet combined with exercise helps maintain muscle while optimizing fat loss. Strength training is particularly important for building and maintaining muscle mass. Exercise helps reduce stress, increase energy,  and improves fitness. Increasing exercise without diet control, however, may not burn enough calories to loose weight.       Start walking three days a week 10-20 minutes at a time    Work towards walking thirty minutes five days a week     Eventually, increase the speed of your walking for 1-2 minutes at time    In addition, we recommend that you review healthy lifestyles and methods for weight loss available through the National Institutes of Health patient information sites:  http://win.niddk.nih.gov/publications/index.htm    And look into health and wellness programs that may be available through your health insurance provider, employer, local community center, or colleen club.    Weight management plan: Patient was referred to their PCP to discuss a diet and exercise plan.  What is a Home Sleep Study?    your doctor can give you a portable sleep monitor to use at home, so you don t have to spend the night in the sleep lab. But you should use a portable monitor only if:   ?Your doctor thinks you have a condition that makes you stop breathing for short periods while you are asleep, called  sleep apnea.    ?You do not have other serious medical problems, such as heart disease or lung disease.    Please bring the home sleep study device back to the sleep center as soon as you are finished with it so we can score it.     The cost of care estimate line is [phone]. They are able to give the patient an estimate of the charges and also an estimate of their insurance coverage/patient responsibility.   After your sleep study is performed, please call us at [phone] or [phone] to schedule for a follow up to review the results of the sleep study.    Consider using one tab of low dose melatonin 3 mg or less on the night of the study.    It is completely voluntary.    Do not drive or operate machinery after intake of melatonin.     Due to the pandemic, we have many people waiting in line for sleep studies- this  wait list is improving each week.   You should receive a call within 2 weeks from our staff to schedule you for  your test.   Depending on the delay in approval by your insurance carrier, the study will be completed within a few days to 2 weeks of that call.

## 2021-08-17 NOTE — PROGRESS NOTES
Jeannie Russo is a 30 year old who is being evaluated via a billable video visit.      How would you like to obtain your AVS? MyChart  If the video visit is dropped, the invitation should be resent by: Send to e-mail at: [email or web address]  Will anyone else be joining your video visit? No    Does patient have any form of state insurance?No   Do you have wifi? Yes  Do you have a smart phone/device?Yes  Can you download an andrew on your phone comfortably with out assistance including You Tube? Yes    Zheng Camarena MA    Video Start Time: 9:59 AM  Video-Visit Details    Type of service:  Video Visit    Video End Time:10:21 AM    Originating Location (pt. Location): Home    Distant Location (provider location):  @[email or web address]@     Platform used for Video Visit: DigitalCh[employer]    Additional 15 minutes was spent performing the following:    -Preparing to see the patient  -Ordering medications, tests, or procedures   -Documenting clinical information in the electronic or other health record     Thank you for the opportunity to participate in the care of  Jeannie Russo.    Assessment and Plan:    In summary Jeannie Russo is a 30 year old year old female here for sleep disturbance.  1. Witness apnea/Chronic fatigue/Snoring/Morbid Obesity   Jeannie Russo has high risk for obstructive sleep apnea based on the history of witness apnea, chronic fatigue, snoring and a crowded airway. I educated the patient on the underlying pathophysiology of obstructive sleep apnea. We reviewed the risks associated with sleep apnea, including increased cardiovascular risk and overall death. We talked about treatments briefly. I recommend getting a Home sleep study. The patient should return to the clinic to discuss results and treatment option in a patient-centered approach.    History of present illness:    She is a 30 year old female who comes to the [facility] clinic with a chief complaint of chronic fatigue that has been going on since  she was a teenager.  She has been told by family members that she has pauses in her breathing during sleep followed by loud snoring.  She also admits that she might be a night owl.  Complicating matters further is the fact that she is being treated for anxiety/depression.  Some of the medication she is taking is sedating.     Ideal Sleep-Wake Cycle(devoid of societal pressure):    Patient would try to initiate sleep at around 2 AM with a sleep latency of variable length. The patient would have 4 awakenings. Final wake up time is around 10 AM.    Stop bang score: 4    Total score - [place]: 14 (8/13/2021  6:26 PM)    Patient told to return in one week after the sleep study is interpreted.    Patient Active Problem List   Diagnosis     Morbid obesity (H)     Mild major depression (H)     Past Medical History:   Diagnosis Date     Depressive disorder Teenage years    Currently taking Lexapro for depression and anxiety     Past Surgical History:   Procedure Laterality Date     SURGICAL HISTORY OF -       None     Current Outpatient Medications   Medication Sig Dispense Refill     buPROPion (WELLBUTRIN XL) 150 MG 24 hr tablet Take 1 tablet (150 mg) by mouth every morning 90 tablet 3     escitalopram (LEXAPRO) 20 MG tablet Take 1 tablet (20 mg) by mouth daily 90 tablet 3     phentermine (ADIPEX-P) 37.5 MG tablet Take 1 tablet (37.5 mg) by mouth every morning (before breakfast) 30 tablet 0     topiramate (TOPAMAX) 50 MG tablet Take 1 tablet (50 mg) by mouth 2 times daily 60 tablet 0     Patient has no known allergies.  Social History     Socioeconomic History     Marital status: Single     Spouse name: Not on file     Number of children: Not on file     Years of education: Not on file     Highest education level: Not on file   Occupational History     Not on file   Tobacco Use     Smoking status: Never Smoker     Smokeless tobacco: Never Used   Substance and Sexual Activity     Alcohol use: No     Drug use: No     Sexual  activity: Never   Other Topics Concern     Parent/sibling w/ CABG, MI or angioplasty before 65F 55M? Yes   Social History Narrative     Not on file     Social Determinants of Health     Financial Resource Strain:      Difficulty of Paying Living Expenses:    Food Insecurity:      Worried About Running Out of Food in the Last Year:      Ran Out of Food in the Last Year:    Transportation Needs:      Lack of Transportation (Medical):      Lack of Transportation (Non-Medical):    Physical Activity:      Days of Exercise per Week:      Minutes of Exercise per Session:    Stress:      Feeling of Stress :    Social Connections:      Frequency of Communication with Friends and Family:      Frequency of Social Gatherings with Friends and Family:      Attends [employer] Services:      Active Member of Clubs or Organizations:      Attends Club or Organization Meetings:      Marital Status:    Intimate Partner Violence:      Fear of Current or Ex-Partner:      Emotionally Abused:      Physically Abused:      Sexually Abused:      Family History   Problem Relation Age of Onset     Obesity Mother      C.A.D. Father      Diabetes Father      Hypertension Father      Obesity Father      Sleep Apnea Father      Sleep Apnea Brother      C.A.D. Paternal Grandfather      Musculoskeletal Disorder Other         MS     Breast Cancer Other      Breast Cancer Other         Physical Exam:  GEN: NAD,   Head: Normocephalic.  EYES: EOMI  ENT: Oropharynx is clear, Boston class 3+ airway. Uvula is intact  Psych: normal mood, normal affect  Snore test:(+)     Labs/Studies:     No results found for: PH, PHARTERIAL, PO2, [id number], SAT, PCO2, HCO3, BASEEXCESS, HANG, BEB  Lab Results   Component Value Date    TSH 0.77 04/21/2021     Lab Results   Component Value Date    GLC 92 04/21/2021     Lab Results   Component Value Date    HGB 13.0 04/21/2021     Lab Results   Component Value Date    BUN 12 04/21/2021    CR 0.97 04/21/2021     Lab Results    Component Value Date    AST 11 04/21/2021    ALT 23 04/21/2021    ALKPHOS 95 04/21/2021    BILITOTAL 1.1 04/21/2021     No results found for: UAMP, UBARB, BENZODIAZEUR, UCANN, UCOC, OPIT, UPCP    Recent Labs   Lab Test 04/21/21  1723      POTASSIUM 4.1   CHLORIDE 104   CO2 27   ANIONGAP 5   GLC 92   BUN 12   CR 0.97   JENNI 8.6       No results found for: YASMEEN Elliott DO  Board Certified in Internal Medicine and Sleep Medicine    (Note created with Dragon voice recognition and unintended spelling errors and word substitutions may occur)

## 2021-08-27 NOTE — TELEPHONE ENCOUNTER
----- Message from Janine Hays sent at 8/26/2021  1:49 PM CDT -----  Regarding: [employer]

## 2021-08-30 ENCOUNTER — OFFICE VISIT (OUTPATIENT)
Dept: FAMILY MEDICINE | Facility: CLINIC | Age: 31
End: 2021-08-30
Payer: COMMERCIAL

## 2021-08-30 VITALS
RESPIRATION RATE: 12 BRPM | BODY MASS INDEX: 53.3 KG/M2 | DIASTOLIC BLOOD PRESSURE: 86 MMHG | WEIGHT: 293 LBS | HEART RATE: 80 BPM | SYSTOLIC BLOOD PRESSURE: 110 MMHG

## 2021-08-30 DIAGNOSIS — E66.01 MORBID OBESITY (H): ICD-10-CM

## 2021-08-30 PROCEDURE — 99213 OFFICE O/P EST LOW 20 MIN: CPT | Performed by: NURSE PRACTITIONER

## 2021-08-30 RX ORDER — TOPIRAMATE 50 MG/1
50 TABLET, FILM COATED ORAL 2 TIMES DAILY
Qty: 180 TABLET | Refills: 0 | Status: SHIPPED | OUTPATIENT
Start: 2021-08-30 | End: 2022-01-01

## 2021-08-30 RX ORDER — PHENTERMINE HYDROCHLORIDE 37.5 MG/1
37.5 TABLET ORAL
Qty: 30 TABLET | Refills: 2 | Status: SHIPPED | OUTPATIENT
Start: 2021-08-30 | End: 2022-01-12

## 2021-08-30 ASSESSMENT — PAIN SCALES - GENERAL: PAINLEVEL: NO PAIN (0)

## 2021-08-30 NOTE — NURSING NOTE
"Chief Complaint   Patient presents with     Weight Check     recheck medications     /86   Pulse 80   Resp 12   Wt (!) 157.9 kg (348 lb)   LMP 08/13/2021   BMI 53.30 kg/m   Estimated body mass index is 53.3 kg/m  as calculated from the following:    Height as of 6/30/21: 1.721 m (5' 7.75\").    Weight as of this encounter: 157.9 kg (348 lb).  Patient presents to the clinic using No DME      Health Maintenance that is potentially due pending provider review:    Health Maintenance Due   Topic Date Due     ANNUAL REVIEW OF HM ORDERS  Never done     DEPRESSION ACTION PLAN  Never done     HIV SCREENING  Never done     HEPATITIS C SCREENING  Never done     DTAP/TDAP/TD IMMUNIZATION (7 - Td or Tdap) 04/28/2010     INFLUENZA VACCINE (1) 09/01/2021        n/a        "

## 2021-08-30 NOTE — PROGRESS NOTES
Assessment & Plan     Morbid obesity (H)  Patient doing significantly well, down almost 50 pounds since April.  Congratulated patient!  We will continue current doses of phentermine and topiramate with recheck in 3 months.  Advised patient to continue eating healthy as well as slowly increase physical activity as tolerated.  - phentermine (ADIPEX-P) 37.5 MG tablet; Take 1 tablet (37.5 mg) by mouth every morning (before breakfast)  - topiramate (TOPAMAX) 50 MG tablet; Take 1 tablet (50 mg) by mouth 2 times daily           See Patient Instructions    Return in about 3 months (around 11/30/2021) for Recheck.    DALLIN Richter [facility]  [facility]    Phoebe Dennis is a 30 year old who presents for the following health issues:    HPI     Weight recheck  Recheck medications  No complaints - everything is going well per pt  Wt Readings from Last 4 Encounters:   08/30/21 (!) 157.9 kg (348 lb)   07/21/21 (!) 168.1 kg (370 lb 9.6 oz)   06/30/21 (!) 170.1 kg (375 lb)   05/21/21 (!) 172.8 kg (381 lb)     Started phentermine and topiramate April 2021  Has been eating very healthy  Cut out gluten and dairy over the last month  Has been doing mainly walking at the gym and some strength training      Review of Systems   Constitutional, HEENT, cardiovascular, pulmonary, gi and gu systems are negative, except as otherwise noted.      Objective    /86   Pulse 80   Resp 12   Wt (!) 157.9 kg (348 lb)   LMP 08/13/2021   BMI 53.30 kg/m    Body mass index is 53.3 kg/m .  Physical Exam   GENERAL APPEARANCE: healthy, alert and no distress  RESP: lungs clear to auscultation - no rales, rhonchi or wheezes  CV: regular rates and rhythm, normal S1 S2, no S3 or S4 and no murmur, click or rub  ABDOMEN: soft, nontender, without hepatosplenomegaly or masses, bowel sounds normal and obese  MS: extremities normal- no gross deformities noted  SKIN: no suspicious lesions or rashes  NEURO: Normal strength  and tone, mentation intact and speech normal  PSYCH: mentation appears normal and affect normal/bright    Diagnostic Test Results:  none          Chart documentation with Dragon Voice recognition Software. Although reviewed after completion, some words and grammatical errors may remain.

## 2021-08-30 NOTE — PATIENT INSTRUCTIONS
Morbid obesity (H)  Patient doing significantly well, down almost 50 pounds since April.  Congratulated patient!  We will continue current doses of phentermine and topiramate with recheck in 3 months.  Advised patient to continue eating healthy as well as slowly increase physical activity as tolerated.  - phentermine (ADIPEX-P) 37.5 MG tablet; Take 1 tablet (37.5 mg) by mouth every morning (before breakfast)  - topiramate (TOPAMAX) 50 MG tablet; Take 1 tablet (50 mg) by mouth 2 times daily

## 2021-08-31 ENCOUNTER — TELEPHONE (OUTPATIENT)
Dept: SLEEP MEDICINE | Facility: CLINIC | Age: 31
End: 2021-08-31

## 2021-09-09 ENCOUNTER — TELEPHONE (OUTPATIENT)
Dept: SLEEP MEDICINE | Facility: CLINIC | Age: 31
End: 2021-09-09

## 2021-09-09 NOTE — TELEPHONE ENCOUNTER
----- Message from Janine Hays sent at 8/26/2021  1:49 PM CDT -----  Regarding: ID [employer]iquePat

## 2021-09-18 ENCOUNTER — HEALTH MAINTENANCE LETTER (OUTPATIENT)
Age: 31
End: 2021-09-18

## 2021-09-22 ENCOUNTER — OFFICE VISIT (OUTPATIENT)
Dept: SLEEP MEDICINE | Facility: CLINIC | Age: 31
End: 2021-09-22
Payer: COMMERCIAL

## 2021-09-22 DIAGNOSIS — R06.83 SNORING: ICD-10-CM

## 2021-09-22 DIAGNOSIS — R53.82 CHRONIC FATIGUE: ICD-10-CM

## 2021-09-22 DIAGNOSIS — E66.01 MORBID OBESITY (H): ICD-10-CM

## 2021-09-22 DIAGNOSIS — R06.81 WITNESSED EPISODE OF APNEA: ICD-10-CM

## 2021-09-22 PROCEDURE — 95800 SLP STDY UNATTENDED: CPT | Performed by: INTERNAL MEDICINE

## 2021-09-22 NOTE — PROGRESS NOTES
Device has been registered and shipped via [employer] on 9/22/21. Patient was notified that package was mailed out. Watch [facility] serial number [id number].

## 2021-09-28 NOTE — PROGRESS NOTES
Watch pat has been scored using rule 1B, 4%.   Patient to follow up with provider to determine appropriate therapy.     Pat AHI: 37.6    Ordering Provider: DO Breann Sánchez [facility], [facility]  Sleep Technologist

## 2021-09-29 ENCOUNTER — TELEPHONE (OUTPATIENT)
Dept: SLEEP MEDICINE | Facility: CLINIC | Age: 31
End: 2021-09-29

## 2021-09-29 NOTE — TELEPHONE ENCOUNTER
Luistccarisa. Please call the patient to schedule for an earlier follow-up visit to review the results of the sleep study.  Please schedule patient to follow-up with me in 2 weeks. May book on my lunch hour except for Wednesdays and Fridays.Thank you.

## 2021-09-29 NOTE — PROCEDURES
"WatchPAT - HOME SLEEP STUDY INTERPRETATION    Patient: Jeannie Russo  MRN: [id number]  YOB: 1990  Study Date: 09/25/21  Referring Provider: Jazlyn Mckeon MD  Ordering Provider: Gerson Elliott DO     Chain of custody patient verification was not enabled.  Chain of custody verification was not present throughout the entire study.     Indications for Home Study: Jeannie Russo is a 30 year old female who presents with symptoms suggestive of obstructive sleep apnea.    Estimated body mass index is 53.3 kg/m  as calculated from the following:    Height as of 6/30/21: 1.721 m (5' 7.75\").    Weight as of 8/30/21: 157.9 kg (348 lb).  Total score - [place]: 14 (8/13/2021  6:26 PM)  Total Score: 4 (8/17/2021 10:01 AM)    Data: A full night home sleep study was performed recording the standard physiologic parameters including peripheral arterial tonometry (PAT), sound/snoring, body position,  movement, sound, and oxygen saturation by pulse oximetry. Pulse rate was estimated by oximetry recording. Sleep staging (wake, REM, light, and deep sleep) was derived from PAT signal.  This study was considered adequate based on > 4 hours of quality oximetry and respiratory recording. As specified by the AASM Manual for the Scoring of Sleep and Associated events, version 2.3, Rule VIII.D 1B, 4% oxygen desaturation scoring for hypopneas is used as a standard of care on all home sleep apnea testing.    Total Recording Time: 7 hrs, 0 min  Total Sleep Time: 6 hrs, 0 min  % of Sleep Time REM: 25.1%    Respiratory:  Snoring: Snoring was present.  Respiratory events: The PAT respiratory disturbance index [pRDI] was 38.7 events per hour.  The PAT apnea/hypopnea index [pAHI] was 37.6 events per hour.  TIAGO was 31.9 events per hour.  During REM sleep the pAHI was 32.7.  Sleep Associated Hypoxemia: sustained hypoxemia was present. Mean oxygen saturation was 96%.  Minimum was 73%.  Time with saturation less than 88% was " 9.1 minutes.    Heart Rate: By pulse oximetry normal rate was noted.     Position: Percent of time spent: supine - 61.2%, prone - 2.5%, on right - 0%, on left - 36.3%.  pAHI was 37.6 per hour supine, N/A per hour prone, N/A per hour on right side, and 35.7 per hour on left side.     Assessment:   Severe obstructive sleep apnea.  Sleep associated hypoxemia was present.    Recommendations:  Consider auto-CPAP at 5-20 cmH2O, oral appliance therapy, positional therapy or polysomnography with full night PAP titration.  Suggest optimizing sleep hygiene and avoiding sleep deprivation.  Weight management.    Diagnosis Code(s): Obstructive Sleep Apnea G47.33, Hypoxemia G47.36    Gerson Elliott DO, September 29, 2021   Diplomate, American Board of Internal Medicine, Sleep Medicine

## 2021-10-04 NOTE — TELEPHONE ENCOUNTER
Attempted to reach Jeannie to schedule a sooner follow up visit with Dr. Elliott for test results. No answer. [facility] (per Dr. Baires message)      Sejal FONTANEZ CMA, SLEEP MEDICINE, 10/4/2021 2:43 PM

## 2021-10-19 PROBLEM — F32.9 MAJOR DEPRESSION: Status: ACTIVE | Noted: 2021-04-21

## 2021-10-21 ASSESSMENT — SLEEP AND FATIGUE QUESTIONNAIRES
HOW LIKELY ARE YOU TO NOD OFF OR FALL ASLEEP WHILE LYING DOWN TO REST IN THE AFTERNOON WHEN CIRCUMSTANCES PERMIT: HIGH CHANCE OF DOZING
HOW LIKELY ARE YOU TO NOD OFF OR FALL ASLEEP WHEN YOU ARE A PASSENGER IN A CAR FOR AN HOUR WITHOUT A BREAK: MODERATE CHANCE OF DOZING
HOW LIKELY ARE YOU TO NOD OFF OR FALL ASLEEP WHILE SITTING AND READING: HIGH CHANCE OF DOZING
HOW LIKELY ARE YOU TO NOD OFF OR FALL ASLEEP WHILE WATCHING TV: MODERATE CHANCE OF DOZING
HOW LIKELY ARE YOU TO NOD OFF OR FALL ASLEEP WHILE SITTING QUIETLY AFTER LUNCH WITHOUT ALCOHOL: MODERATE CHANCE OF DOZING
HOW LIKELY ARE YOU TO NOD OFF OR FALL ASLEEP IN A CAR, WHILE STOPPED FOR A FEW MINUTES IN TRAFFIC: SLIGHT CHANCE OF DOZING
HOW LIKELY ARE YOU TO NOD OFF OR FALL ASLEEP WHILE SITTING INACTIVE IN A PUBLIC PLACE: SLIGHT CHANCE OF DOZING
HOW LIKELY ARE YOU TO NOD OFF OR FALL ASLEEP WHILE SITTING AND TALKING TO SOMEONE: MODERATE CHANCE OF DOZING

## 2021-10-22 NOTE — PROGRESS NOTES
Jeannie is a 31 year old who is being evaluated via a billable video visit.      How would you like to obtain your AVS? MyChart  If the video visit is dropped, the invitation should be resent by: Send to e-mail at: [email or web address]  Will anyone else be joining your video visit? Kita Faria    Video Start Time: 2:29 PM  Video-Visit Details    Type of service:  Video Visit    Video End Time:2:38 PM    Originating Location (pt. Location): Home    Distant Location (provider location):  [facility]     Platform used for Video Visit: [employer]     Additional 10 minutes was spent performing the following:    -Preparing to see the patient (eg, review of tests)   -Ordering medications, tests, or procedures   -Documenting clinical information in the electronic or other health record     Thank you for the opportunity to participate in the care of Jeannie Russo.     She is a 31 year old  female patient who comes to the sleep medicine clinic for review of sleep study results. The study was completed on 09/25/21  which showed that the patient had severe obstructive sleep apnea (AHI=37.6).    Assessment and Plan:  In summary Jeannie Russo is a 31 year old year old female here for review of sleep study results.  1. Obstructive Sleep Apnea  We had an extensive conversation to review the results of her sleep study and to  her on the importance of treating sleep apnea. Patient decided to proceed with CPAP. She will start using the device as soon as she receives it with the intention to use if for the entire night. We discussed some tips to increase PAP tolerance as well as the normal curve of adaptation. CPAP is going to provide improved respiratory function during the night but it can cause some sleep disruption that tends to improve with continuous usage. She should return to the clinic in 7 weeks to review compliance and efficacy monitoring. Since the patient does not have any  preference, we will use [employer] as the durable medical equipment company.     Total score - [place]: 16 (10/21/2021  4:39 PM)    Patient Active Problem List   Diagnosis     Morbid obesity (H)     Mild major depression (H)       Current Outpatient Medications   Medication Sig Dispense Refill     buPROPion (WELLBUTRIN XL) 150 MG 24 hr tablet Take 1 tablet (150 mg) by mouth every morning 90 tablet 3     escitalopram (LEXAPRO) 20 MG tablet Take 1 tablet (20 mg) by mouth daily 90 tablet 3     phentermine (ADIPEX-P) 37.5 MG tablet Take 1 tablet (37.5 mg) by mouth every morning (before breakfast) 30 tablet 2     topiramate (TOPAMAX) 50 MG tablet Take 1 tablet (50 mg) by mouth 2 times daily 180 tablet 0       No Known Allergies    Physical Exam:  GEN: NAD  Psych: normal mood, normal affect     Labs/Studies:  - We reviewed the results of the overnight study as described on the HPI.     No results found for: YASMEEN      Patient verbalized understanding of these issues, agrees with the plan and all questions were answered today. Patient was given an opportuntity to voice any other symptoms or concerns not listed above. Patient did not have any other symptoms or concerns.      Gerson Elliott DO  Board Certified in Internal Medicine and Sleep Medicine      (Note created with Dragon voice recognition and unintended spelling errors and word substitutions may occur)

## 2021-10-25 ENCOUNTER — VIRTUAL VISIT (OUTPATIENT)
Dept: SLEEP MEDICINE | Facility: CLINIC | Age: 31
End: 2021-10-25
Payer: COMMERCIAL

## 2021-10-25 DIAGNOSIS — G47.10 HYPERSOMNIA: ICD-10-CM

## 2021-10-25 DIAGNOSIS — G47.33 OBSTRUCTIVE SLEEP APNEA: Primary | ICD-10-CM

## 2021-10-25 PROCEDURE — 99213 OFFICE O/P EST LOW 20 MIN: CPT | Mod: GT | Performed by: INTERNAL MEDICINE

## 2021-10-25 NOTE — PATIENT INSTRUCTIONS
"     What is sleep apnea?     Sleep apnea is a condition that makes you stop breathing for short periods while you are asleep. There are 2 types of sleep apnea. One is called \"obstructive sleep apnea,\" and the other is called \"central sleep apnea.\"  In obstructive sleep apnea, you stop breathing because your throat narrows or closes (figure 1). In central sleep apnea, you stop breathing because your brain does not send the right signals to your muscles to make you breathe. When people talk about sleep apnea, they are usually referring to obstructive sleep apnea, which is what this article is about.  People with sleep apnea do not know that they stop breathing when they are asleep. But they do sometimes wake up startled or gasping for breath. They also often hear from loved ones that they snore.  What are the symptoms of sleep apnea? -- The main symptoms of sleep apnea are loud snoring, tiredness, and daytime sleepiness. Other symptoms can include:  ?Restless sleep  ?Waking up choking or gasping  ?Morning headaches, dry mouth, or sore throat  ?Waking up often to urinate  ?Waking up feeling unrested or groggy  ?Trouble thinking clearly or remembering things  Some people with sleep apnea don't have symptoms, or they don't know they have them. They might figure that it's normal to be tired or to snore a lot.  Should I see a doctor or nurse? -- Yes. If you think you might have sleep apnea, see your doctor.  Is there a test for sleep apnea? -- Yes. If your doctor or nurse suspects you have sleep apnea, he or she might send you for a \"sleep study.\" Sleep studies can sometimes be done at home, but they are usually done in a sleep lab. For the study, you spend the night in the lab, and you are hooked up to different machines that monitor your heart rate, breathing, and other body functions. The results of the test will tell your doctor or nurse if you have the disorder.  Is there anything I can do on my own to help my sleep " "apnea? -- Yes. Here are some things that might help:  ?Stay off your back when sleeping. (This is not always practical, because people cannot control their position while asleep. Plus, it only helps some people.)  ?Lose weight, if you are overweight  ?Avoid alcohol, because it can make sleep apnea worse  How is sleep apnea treated? -- The most effective treatment for sleep apnea is a device that keeps your airway open while you sleep. Treatment with this device is called \"continuous positive airway pressure,\" or CPAP. People getting CPAP wear a face mask at night that keeps them breathing (figure 2).  If your doctor or nurse recommends a CPAP machine, try to be patient about using it. The mask might seem uncomfortable to wear at first, and the machine might seem noisy, but using the machine can really pay off. People with sleep apnea who use a CPAP machine feel more rested and generally feel better.  There is also another device that you wear in your mouth called an \"oral appliance\" or \"mandibular advancement device.\" It also helps keep your airway open while you sleep.  In rare cases, when nothing else helps, doctors recommend surgery to keep the airway open. Surgery to do this is not always effective, and even when it is, the problem can come back.  Is sleep apnea dangerous? -- It can be. People with sleep apnea do not get good-quality sleep, so they are often tired and not alert. This puts them at risk for car accidents and other types of accidents. Plus, studies show that people with sleep apnea are more likely than others to have high blood pressure, heart attacks, and other serious heart problems. In people with severe sleep apnea, getting treated (for example, with a CPAP machine) can help prevent some of these problems.     GRAPHICS  Airway in a person with sleep apnea    Normally when a person sleeps, the airway remains open, and air can pass from the nose and mouth to the lungs. In a person with sleep " apnea, parts of the throat and mouth drop into the airway and block off the flow of air. This can cause loud snoring and interrupt breathing for short periods.  Graphic 93144 Version 5.0      Continuous positive airway pressure (CPAP) for sleep apnea    The CPAP mask gently blows air into your nose while you sleep. It puts just enough pressure on your airway to keep it from closing. The mask in this picture fits over just the nose. Other CPAP devices have masks that fit over the nose and mouth.                Equipment Instructions    We will process your PAP order and send it to a Durable Medical Equipment (DME) provider.    The medical equipment company should call you within 7 days.  If you have not heard from the company, please contact them to see if they received your order and are planning to call you.    Please call us at [phone] if you are unable to contact the medical equipment company or if they do not have the order.    If you are starting a new PAP machine, please call us after you use it the first night to let us know how it went. This call also helps us know that you received your equipment and that everything is ready. Please use our central phone number [phone]    Contact information for [employer] company:    [employer] Tel: [phone]

## 2021-10-26 NOTE — PROGRESS NOTES
Instructional letter for scheduling PAP follow up visit has been sent to patient via GISEL FONTANEZ CMA, SLEEP MEDICINE, 10/26/2021 9:30 AM

## 2022-01-01 ENCOUNTER — MYC REFILL (OUTPATIENT)
Dept: FAMILY MEDICINE | Facility: CLINIC | Age: 32
End: 2022-01-01
Payer: COMMERCIAL

## 2022-01-01 DIAGNOSIS — E66.01 MORBID OBESITY (H): ICD-10-CM

## 2022-01-04 RX ORDER — TOPIRAMATE 50 MG/1
50 TABLET, FILM COATED ORAL 2 TIMES DAILY
Qty: 180 TABLET | Refills: 0 | Status: SHIPPED | OUTPATIENT
Start: 2022-01-04 | End: 2022-01-12

## 2022-01-12 ENCOUNTER — OFFICE VISIT (OUTPATIENT)
Dept: FAMILY MEDICINE | Facility: CLINIC | Age: 32
End: 2022-01-12
Payer: COMMERCIAL

## 2022-01-12 VITALS
SYSTOLIC BLOOD PRESSURE: 124 MMHG | DIASTOLIC BLOOD PRESSURE: 78 MMHG | HEIGHT: 68 IN | RESPIRATION RATE: 24 BRPM | TEMPERATURE: 97.4 F | HEART RATE: 76 BPM | OXYGEN SATURATION: 99 % | WEIGHT: 293 LBS | BODY MASS INDEX: 44.41 KG/M2

## 2022-01-12 DIAGNOSIS — E66.01 MORBID OBESITY (H): ICD-10-CM

## 2022-01-12 DIAGNOSIS — F32.0 MILD MAJOR DEPRESSION (H): ICD-10-CM

## 2022-01-12 DIAGNOSIS — G47.33 OSA (OBSTRUCTIVE SLEEP APNEA): ICD-10-CM

## 2022-01-12 PROCEDURE — 99214 OFFICE O/P EST MOD 30 MIN: CPT | Performed by: NURSE PRACTITIONER

## 2022-01-12 RX ORDER — PHENTERMINE HYDROCHLORIDE 37.5 MG/1
37.5 TABLET ORAL
Qty: 30 TABLET | Refills: 2 | Status: SHIPPED | OUTPATIENT
Start: 2022-01-12 | End: 2022-04-27

## 2022-01-12 RX ORDER — TOPIRAMATE 50 MG/1
50 TABLET, FILM COATED ORAL 2 TIMES DAILY
Qty: 180 TABLET | Refills: 0 | Status: SHIPPED | OUTPATIENT
Start: 2022-01-12 | End: 2022-06-08

## 2022-01-12 ASSESSMENT — ANXIETY QUESTIONNAIRES
1. FEELING NERVOUS, ANXIOUS, OR ON EDGE: SEVERAL DAYS
7. FEELING AFRAID AS IF SOMETHING AWFUL MIGHT HAPPEN: SEVERAL DAYS
4. TROUBLE RELAXING: SEVERAL DAYS
6. BECOMING EASILY ANNOYED OR IRRITABLE: NOT AT ALL
5. BEING SO RESTLESS THAT IT IS HARD TO SIT STILL: SEVERAL DAYS
GAD7 TOTAL SCORE: 8
GAD7 TOTAL SCORE: 8
3. WORRYING TOO MUCH ABOUT DIFFERENT THINGS: MORE THAN HALF THE DAYS
2. NOT BEING ABLE TO STOP OR CONTROL WORRYING: MORE THAN HALF THE DAYS
GAD7 TOTAL SCORE: 8
7. FEELING AFRAID AS IF SOMETHING AWFUL MIGHT HAPPEN: SEVERAL DAYS

## 2022-01-12 ASSESSMENT — PATIENT HEALTH QUESTIONNAIRE - PHQ9
SUM OF ALL RESPONSES TO PHQ QUESTIONS 1-9: 11
SUM OF ALL RESPONSES TO PHQ QUESTIONS 1-9: 11
10. IF YOU CHECKED OFF ANY PROBLEMS, HOW DIFFICULT HAVE THESE PROBLEMS MADE IT FOR YOU TO DO YOUR WORK, TAKE CARE OF THINGS AT HOME, OR GET ALONG WITH OTHER PEOPLE: SOMEWHAT DIFFICULT

## 2022-01-12 ASSESSMENT — PAIN SCALES - GENERAL: PAINLEVEL: NO PAIN (0)

## 2022-01-12 ASSESSMENT — MIFFLIN-ST. JEOR: SCORE: 2191.4

## 2022-01-12 NOTE — PROGRESS NOTES
Assessment & Plan     Morbid obesity (H)  Chronic, improving. Weight down 31 lbs since August. Tolerating medications. Keep up the good work and follow up in 3 months.     Wt Readings from Last 3 Encounters:   01/12/22 143.8 kg (317 lb)   08/30/21 (!) 157.9 kg (348 lb)   07/21/21 (!) 168.1 kg (370 lb 9.6 oz)      - phentermine (ADIPEX-P) 37.5 MG tablet; Take 1 tablet (37.5 mg) by mouth every morning (before breakfast)  - topiramate (TOPAMAX) 50 MG tablet; Take 1 tablet (50 mg) by mouth 2 times daily    Mild major depression (H)  Chronic, somewhat worsening. Has had a lot of recent stressors/changes. Feels is doing well on current medication. Did discuss therapy which patient is agreeable to. Referral placed and should be reaching out to patient and other resources in patient instructions as well. Follow-up if symptoms worseninig.   - Adult Mental Health Referral; Future    FABIAN (obstructive sleep apnea)  Recently diagnosed and started a CPAP machine. Getting used to, getting little sleep. Encouraged continued use and follow up as necessary.               See Patient Instructions    Return in about 3 months (around 4/12/2022) for Recheck.    Ivanna Hernandez, DNP, APRN-CNP   [facility]     Jeannie Russo is a 31 year old female who presents today for the following   health issues:    HPI    Depression and Anxiety Follow-Up    How are you doing with your depression since your last visit? Worsened     How are you doing with your anxiety since your last visit?  Worsened     Are you having other symptoms that might be associated with depression or anxiety? No    Have you had a significant life event? OTHER: quit job     Do you have any concerns with your use of alcohol or other drugs? No    Social History     Tobacco Use     Smoking status: Never Smoker     Smokeless tobacco: Never Used   Substance Use Topics     Alcohol use: No     Drug use: No     PHQ 5/21/2021 1/12/2022   PHQ-9  Total Score 2 11   Q9: Thoughts of better off dead/self-harm past 2 weeks Not at all Not at all     MAG-7 SCORE 1/12/2022   Total Score 8 (mild anxiety)   Total Score 8     Last PHQ-9 1/12/2022   1.  Little interest or pleasure in doing things 1   2.  Feeling down, depressed, or hopeless 1   3.  Trouble falling or staying asleep, or sleeping too much 2   4.  Feeling tired or having little energy 2   5.  Poor appetite or overeating 2   6.  Feeling bad about yourself 1   7.  Trouble concentrating 2   8.  Moving slowly or restless 0   Q9: Thoughts of better off dead/self-harm past 2 weeks 0   PHQ-9 Total Score 11     MAG-7  1/12/2022   1. Feeling nervous, anxious, or on edge 1   2. Not being able to stop or control worrying 2   3. Worrying too much about different things 2   4. Trouble relaxing 1   5. Being so restless that it is hard to sit still 1   6. Becoming easily annoyed or irritable 0   7. Feeling afraid, as if something awful might happen 1   MAG-7 Total Score 8       Suicide Assessment Five-step Evaluation and Treatment (SAFE-T)      How many servings of fruits and vegetables do you eat daily?  2-3    On average, how many sweetened beverages do you drink each day (Examples: soda, juice, sweet tea, etc.  Do NOT count diet or artificially sweetened beverages)?   0    How many days per week do you exercise enough to make your heart beat faster? 3 or less  3    How many minutes a day do you exercise enough to make your heart beat faster? 60 or more    How many days per week do you miss taking your medication? 0      Medication Followup of phentermine    Taking Medication as prescribed: yes    Side Effects:  None, sleeping well, using cpap - has had for 3 weeks     Medication Helping Symptoms:  Yes  Wt Readings from Last 4 Encounters:   01/12/22 143.8 kg (317 lb)   08/30/21 (!) 157.9 kg (348 lb)   07/21/21 (!) 168.1 kg (370 lb 9.6 oz)   06/30/21 (!) 170.1 kg (375 lb)        Hasn't taken the phentermine since ran  "out - notices more snacky       Review of Systems    Constitutional, HEENT, cardiovascular, pulmonary, gi and gu systems are negative, except as otherwise noted.    Objective   /78   Pulse 76   Temp 97.4  F (36.3  C)   Resp 24   Ht 1.727 m (5' 8\")   Wt 143.8 kg (317 lb)   LMP 12/26/2021 (Exact Date)   SpO2 99%   Breastfeeding No   BMI 48.20 kg/m    Body mass index is 48.2 kg/m .    Physical Exam  GENERAL APPEARANCE: healthy, alert and no distress  NECK: no adenopathy, no asymmetry, masses, or scars and thyroid normal to palpation  RESP: lungs clear to auscultation - no rales, rhonchi or wheezes  CV: regular rates and rhythm, normal S1 S2, no S3 or S4 and no murmur, click or rub  ABDOMEN: soft, nontender, without hepatosplenomegaly or masses, bowel sounds normal and obese  MS: extremities normal- no gross deformities noted  SKIN: no suspicious lesions or rashes  NEURO: Normal strength and tone, mentation intact and speech normal  PSYCH: mentation appears normal and affect normal/bright    Diagnostic Test Results:  none      Chart documentation with Dragon Voice recognition Software. Although reviewed after completion, some words and grammatical errors may remain.          "

## 2022-01-12 NOTE — PATIENT INSTRUCTIONS
Morbid obesity (H)  Chronic, improving. Weight down 31 lbs since August. Tolerating medications. Keep up the good work and follow up in 3 months.     Wt Readings from Last 3 Encounters:   01/12/22 143.8 kg (317 lb)   08/30/21 (!) 157.9 kg (348 lb)   07/21/21 (!) 168.1 kg (370 lb 9.6 oz)      - phentermine (ADIPEX-P) 37.5 MG tablet; Take 1 tablet (37.5 mg) by mouth every morning (before breakfast)  - topiramate (TOPAMAX) 50 MG tablet; Take 1 tablet (50 mg) by mouth 2 times daily    Mild major depression (H)  Chronic, somewhat worsening. Has had a lot of recent stressors/changes. Feels is doing well on current medication. Did discuss therapy which patient is agreeable to. Referral placed and should be reaching out to patient and other resources in patient instructions as well. Follow-up if symptoms worseninig.   - Adult Mental Health Referral; Future    FABIAN (obstructive sleep apnea)  Recently diagnosed and started a CPAP machine. Getting used to, getting little sleep. Encouraged continued use and follow up as necessary.     Other Therapy options   [facility] ([place])-- [phone]  [facility] ([place]) -- [phone]  [facility] ([facility]) -- [phone]  [facility] ([place]) -- [phone]  [facility] ([place], other [facility] as well) -- [phone]  [facility] ([place]) -- [phone]  [facility] ([place], multiple locations) -- [phone]  [facility] ([facility], [facility]) -- [phone]

## 2022-01-13 ASSESSMENT — ANXIETY QUESTIONNAIRES: GAD7 TOTAL SCORE: 8

## 2022-02-09 ENCOUNTER — E-VISIT (OUTPATIENT)
Dept: URGENT CARE | Facility: URGENT CARE | Age: 32
End: 2022-02-09
Payer: COMMERCIAL

## 2022-02-09 DIAGNOSIS — Z20.822 CLOSE EXPOSURE TO 2019 NOVEL CORONAVIRUS: Primary | ICD-10-CM

## 2022-02-09 PROCEDURE — 99421 OL DIG E/M SVC 5-10 MIN: CPT | Performed by: EMERGENCY MEDICINE

## 2022-02-09 NOTE — PATIENT INSTRUCTIONS
Dear Jeannie,    Based on your exposure to COVID-19 (coronavirus), we would like to test you for this virus. I have placed an order for this test. The best time for testing is 5-7 days after the exposure.    How to schedule:  Go to your nanoMR home page and scroll down to the section that says  You have an appointment that needs to be scheduled  and click the large green button that says  Schedule Now  and follow the steps to find the next available opening.     If you are unable to complete these nanoMR scheduling steps, please call [phone] to schedule your testing.     Monoclonal antibody treatment after exposure:  Because you have been exposed to COVID-19, you may be able to receive a treatment with monoclonal antibodies. This treatment can lower your risk of severe illness and going to the hospital. It is given through an IV or under your skin (subcutaneous) and must be given at an infusion center.   To be eligible, you must be 12 years or older, at least 88 pounds and:    Are not fully vaccinated against COVID-19, OR    Are immunocompromised     If you would like to sign up to be considered to receive the monoclonal antibody medicine, please complete a participation form through the [place] Department of [facility] here:  MNRAP ([email or web address]). You may also call the [facility] COVID-19 Public Hotline at 1-572.437.8515 (open Mon-Fri: 9am-7pm and Sat: 10am-6pm).     Not all people who are eligible will receive the medicine since supply is limited. You will be contacted in the next 1 to 2 business days only if you are selected. If you do not receive a call, you have not been selected to receive the medicine. If you have any questions about this medication, please contact your primary care provider. For more information, see [email or web address]    Return to work/school/ guidance:   Please let your workplace manager and  staffing office know when your quarantine ends. We cannot give you an exact date as it depends on the information below. You can calculate this on your own or work with your manager/staffing office to calculate this.    Quarantine Guidelines:  You are considered exposed if you have been within 6 feet of an infected person(s) for 15 minutes or more over a 24-hour period. Quarantine will start after the LAST time you had contact with the infected person while they were contagious (for example, if you saw someone on Monday and Wednesday, your quarantine would start after Wednesday).     If you have NO symptoms (asymptomatic):    Stay home for 14 days (quarantine) after your LAST contact with a person who has COVID-19 (this remains the CDC recommendation for greatest protection against spread of COVID-19), OR    10-day quarantine with no test, OR    Minimum 7-day quarantine with negative RT-PCR test collected on day 5 or later    Quarantine Guideline exceptions:    People who have been fully vaccinated do not need to quarantine unless they have symptoms. You are considered fully vaccinated 2 weeks after your 2nd dose in a 2-dose series or 2 weeks after a single-dose series. This includes vaccinated health care workers.  o Fully vaccinated people should still get tested 5-7 days after exposure, even if they don t have symptoms.   Note: If you have ongoing exposure to the COVID-positive person, this quarantine period may be more than 14 days. For example, if you continue to be exposed to your child who tested positive and cannot isolate from them, then the quarantine of 7-14 days can't start until your child is no longer contagious. This is typically 10 days from onset of the child's symptoms. So, the total duration may be 17-24 days in this case.   Please contact your doctor if you have questions or call the [facility] Public Hotline: [phone] (Mon-Fri: 9am-7pm and Sat: 10am-6pm).     How to Quarantine:     Monitor your  symptoms until 14 days after your last exposure. If you develop signs or symptoms, isolate and get tested (even if you have been tested already).    Stay home and away from others. Don't go to school or anywhere else. Generally, quarantine means staying home from work but there are some exceptions to this. Please contact your workplace. Cover your mouth and nose with a face covering if you must be around other people.     Wash your hands and face often. Use soap and water.    What are the symptoms of COVID-19?  The most common symptoms are cough, fever and trouble breathing. Less common symptoms include headache, body aches, fatigue (feeling very tired), chills, sore throat, stuffy or runny nose, diarrhea (loose poop), loss of taste or smell, belly pain, and nausea or vomiting (feeling sick to your stomach or throwing up).  If you develop symptoms, follow these guidelines:    If you're normally healthy: Please start another eVisit.    If you have a serious health problem (like cancer, heart failure, an organ transplant or kidney disease): Call your specialty clinic. Let them know that you might have COVID-19.    Where can I get more information?    [facility] - About COVID-19: [email or web address]     CDC - What to Do If You're Sick:     www.cdc.gov/coronavirus/2019-ncov/about/steps-when-sick.html    CDC - Ending Home Isolation:  https://www.cdc.gov/coronavirus/2019-ncov/your-health/quarantine-isolation.html    CDC - Caring for Someone:  www.cdc.gov/coronavirus/2019-ncov/if-you-are-sick/care-for-someone.html    [facility] clinical trials (COVID-19 research studies): [email or web address].[facility]/umn-clinical-trials    Below are the COVID-19 hotlines at the [facility] of Health ([facility]). Interpreters are available.  o For health questions: Call 770-057-4766 or 1-379.869.2056 (7 am to 7 pm)  o For questions about schools and childcare: Call 451-707-7389 or 1-542.682.8341 (7 a.m. to 7  p.m.)

## 2022-03-04 ENCOUNTER — LAB (OUTPATIENT)
Dept: LAB | Facility: CLINIC | Age: 32
End: 2022-03-04
Attending: EMERGENCY MEDICINE
Payer: COMMERCIAL

## 2022-03-04 DIAGNOSIS — Z20.822 CLOSE EXPOSURE TO 2019 NOVEL CORONAVIRUS: ICD-10-CM

## 2022-03-04 LAB — SARS-COV-2 RNA RESP QL NAA+PROBE: NEGATIVE

## 2022-03-04 PROCEDURE — U0003 INFECTIOUS AGENT DETECTION BY NUCLEIC ACID (DNA OR RNA); SEVERE ACUTE RESPIRATORY SYNDROME CORONAVIRUS 2 (SARS-COV-2) (CORONAVIRUS DISEASE [COVID-19]), AMPLIFIED PROBE TECHNIQUE, MAKING USE OF HIGH THROUGHPUT TECHNOLOGIES AS DESCRIBED BY CMS-2020-01-R: HCPCS

## 2022-03-04 PROCEDURE — U0005 INFEC AGEN DETEC AMPLI PROBE: HCPCS

## 2022-03-20 ENCOUNTER — LAB (OUTPATIENT)
Dept: FAMILY MEDICINE | Facility: CLINIC | Age: 32
End: 2022-03-20
Attending: FAMILY MEDICINE
Payer: COMMERCIAL

## 2022-03-20 DIAGNOSIS — Z20.822 ENCOUNTER FOR LABORATORY TESTING FOR COVID-19 VIRUS: ICD-10-CM

## 2022-03-20 PROCEDURE — U0003 INFECTIOUS AGENT DETECTION BY NUCLEIC ACID (DNA OR RNA); SEVERE ACUTE RESPIRATORY SYNDROME CORONAVIRUS 2 (SARS-COV-2) (CORONAVIRUS DISEASE [COVID-19]), AMPLIFIED PROBE TECHNIQUE, MAKING USE OF HIGH THROUGHPUT TECHNOLOGIES AS DESCRIBED BY CMS-2020-01-R: HCPCS

## 2022-03-20 PROCEDURE — 99207 PR NO CHARGE LOS: CPT

## 2022-03-20 PROCEDURE — U0005 INFEC AGEN DETEC AMPLI PROBE: HCPCS

## 2022-03-21 LAB — SARS-COV-2 RNA RESP QL NAA+PROBE: NEGATIVE

## 2022-04-26 DIAGNOSIS — E66.01 MORBID OBESITY (H): ICD-10-CM

## 2022-04-26 DIAGNOSIS — F32.0 MILD MAJOR DEPRESSION (H): ICD-10-CM

## 2022-04-27 RX ORDER — PHENTERMINE HYDROCHLORIDE 37.5 MG/1
37.5 TABLET ORAL
Qty: 30 TABLET | Refills: 0 | Status: SHIPPED | OUTPATIENT
Start: 2022-04-27 | End: 2022-06-08

## 2022-04-27 RX ORDER — BUPROPION HYDROCHLORIDE 150 MG/1
TABLET ORAL
Qty: 90 TABLET | Refills: 0 | Status: SHIPPED | OUTPATIENT
Start: 2022-04-27 | End: 2022-06-08

## 2022-06-08 ENCOUNTER — OFFICE VISIT (OUTPATIENT)
Dept: FAMILY MEDICINE | Facility: CLINIC | Age: 32
End: 2022-06-08
Payer: COMMERCIAL

## 2022-06-08 VITALS
SYSTOLIC BLOOD PRESSURE: 118 MMHG | TEMPERATURE: 99 F | DIASTOLIC BLOOD PRESSURE: 80 MMHG | HEART RATE: 81 BPM | HEIGHT: 68 IN | RESPIRATION RATE: 30 BRPM | OXYGEN SATURATION: 99 % | BODY MASS INDEX: 44.41 KG/M2 | WEIGHT: 293 LBS

## 2022-06-08 DIAGNOSIS — E66.01 MORBID OBESITY (H): Primary | ICD-10-CM

## 2022-06-08 DIAGNOSIS — F32.0 MILD MAJOR DEPRESSION (H): ICD-10-CM

## 2022-06-08 PROCEDURE — 99214 OFFICE O/P EST MOD 30 MIN: CPT | Performed by: NURSE PRACTITIONER

## 2022-06-08 RX ORDER — PHENTERMINE HYDROCHLORIDE 37.5 MG/1
37.5 TABLET ORAL
Qty: 90 TABLET | Refills: 0 | Status: SHIPPED | OUTPATIENT
Start: 2022-06-08 | End: 2023-01-03

## 2022-06-08 RX ORDER — BUPROPION HYDROCHLORIDE 150 MG/1
TABLET ORAL
Qty: 90 TABLET | Refills: 0 | Status: SHIPPED | OUTPATIENT
Start: 2022-06-08 | End: 2023-01-03

## 2022-06-08 RX ORDER — TOPIRAMATE 50 MG/1
50 TABLET, FILM COATED ORAL 2 TIMES DAILY
Qty: 180 TABLET | Refills: 0 | Status: CANCELLED | OUTPATIENT
Start: 2022-06-08

## 2022-06-08 RX ORDER — ESCITALOPRAM OXALATE 20 MG/1
20 TABLET ORAL DAILY
Qty: 90 TABLET | Refills: 3 | Status: SHIPPED | OUTPATIENT
Start: 2022-06-08 | End: 2023-03-29

## 2022-06-08 RX ORDER — PHENTERMINE HYDROCHLORIDE 37.5 MG/1
37.5 TABLET ORAL
Qty: 30 TABLET | Refills: 0 | Status: CANCELLED | OUTPATIENT
Start: 2022-06-08

## 2022-06-08 RX ORDER — TOPIRAMATE 50 MG/1
TABLET, FILM COATED ORAL
Qty: 270 TABLET | Refills: 0 | Status: SHIPPED | OUTPATIENT
Start: 2022-06-08 | End: 2023-01-03

## 2022-06-08 ASSESSMENT — ANXIETY QUESTIONNAIRES
GAD7 TOTAL SCORE: 9
7. FEELING AFRAID AS IF SOMETHING AWFUL MIGHT HAPPEN: NEARLY EVERY DAY
GAD7 TOTAL SCORE: 9
8. IF YOU CHECKED OFF ANY PROBLEMS, HOW DIFFICULT HAVE THESE MADE IT FOR YOU TO DO YOUR WORK, TAKE CARE OF THINGS AT HOME, OR GET ALONG WITH OTHER PEOPLE?: SOMEWHAT DIFFICULT
2. NOT BEING ABLE TO STOP OR CONTROL WORRYING: SEVERAL DAYS
3. WORRYING TOO MUCH ABOUT DIFFERENT THINGS: SEVERAL DAYS
5. BEING SO RESTLESS THAT IT IS HARD TO SIT STILL: SEVERAL DAYS
6. BECOMING EASILY ANNOYED OR IRRITABLE: SEVERAL DAYS
4. TROUBLE RELAXING: SEVERAL DAYS
7. FEELING AFRAID AS IF SOMETHING AWFUL MIGHT HAPPEN: NEARLY EVERY DAY
GAD7 TOTAL SCORE: 9
1. FEELING NERVOUS, ANXIOUS, OR ON EDGE: SEVERAL DAYS

## 2022-06-08 ASSESSMENT — PATIENT HEALTH QUESTIONNAIRE - PHQ9
10. IF YOU CHECKED OFF ANY PROBLEMS, HOW DIFFICULT HAVE THESE PROBLEMS MADE IT FOR YOU TO DO YOUR WORK, TAKE CARE OF THINGS AT HOME, OR GET ALONG WITH OTHER PEOPLE: NOT DIFFICULT AT ALL
SUM OF ALL RESPONSES TO PHQ QUESTIONS 1-9: 3
SUM OF ALL RESPONSES TO PHQ QUESTIONS 1-9: 3

## 2022-06-08 ASSESSMENT — PAIN SCALES - GENERAL: PAINLEVEL: NO PAIN (0)

## 2022-06-08 NOTE — PROGRESS NOTES
"Assessment & Plan     Morbid obesity (H)  Chronic, improving.  Patient continues to lose weight with phentermine and topiramate combination.  Down 13 pounds since last office visit, however patient feels she has plateaued in the last couple of months.  Is fairly active and eating healthy.  Did discuss creasing topiramate, is on the max dose of phentermine.  Patient would like to trial.  We will have patient increase to 1-1/2 tablets in the morning and 1 tablet at supper and may increase to 1-1/2 tablets at supper after a week if needed.  Patient to follow-up in clinic in approximately 3 months as [place] is leaving the country [place] follow[place].  - phentermine (ADIPEX-P) 37.5 MG tablet; Take 1 tablet (37.5 mg) by mouth every morning (before breakfast)  - topiramate (TOPAMAX) 50 MG tablet; Take 1.5 tablets (75 mg) by mouth every morning AND 1 tablet (50 mg) daily (with dinner). Can increase to 75 mg two times daily after 1 week if needed    Mild major depression (H)  Chronic, stable.  Doing well on Wellbutrin and Lexapro.  Continue medications without any changes.  - buPROPion (WELLBUTRIN XL) 150 MG 24 hr tablet; TAKE 1 TABLET BY MOUTH IN THE MORNING  - escitalopram (LEXAPRO) 20 MG tablet; Take 1 tablet (20 mg) by mouth daily           BMI:   Estimated body mass index is 46.34 kg/m  as calculated from the following:    Height as of this encounter: 1.727 m (5' 8\").    Weight as of this encounter: 138.3 kg (304 lb 12.8 oz).   Weight management plan: Pharmacotherapy with phentermine and topiramate    See Patient Instructions. After discussion with patient, patient verbalizes and agreeable to receive AVS instructions via My Chart, not printed today     Return in about 3 months (around 9/8/2022) for Recheck.    Ivanna Montaño, DNP, APRN-CNP   [facility]    Subjective     Jeannie Russo is a 31 year old female who presents today for the following   health issues:    HPI  Answers for HPI/ROS submitted by " the patient on 6/8/2022  If you checked off any problems, how difficult have these problems made it for you to do your work, take care of things at home, or get along with other people?: Not difficult at all  PHQ9 TOTAL SCORE: 3  MAG 7 TOTAL SCORE: 9  What is the reason for your visit today? : Discuss medication  How many servings of fruits and vegetables do you eat daily?: 2-3  On average, how many sweetened beverages do you drink each day (Examples: soda, juice, sweet tea, etc.  Do NOT count diet or artificially sweetened beverages)?: 0  How many minutes a day do you exercise enough to make your heart beat faster?: 20 to 29  How many days a week do you exercise enough to make your heart beat faster?: 4  How many days per week do you miss taking your medication?: 0    Medication Followup for weight loss    Taking Medication as prescribed: yes    Side Effects:  None    Medication Helping Symptoms:  NO-doesn't seem to help any more  Wt Readings from Last 4 Encounters:   06/08/22 138.3 kg (304 lb 12.8 oz)   01/12/22 143.8 kg (317 lb)   08/30/21 (!) 157.9 kg (348 lb)   07/21/21 (!) 168.1 kg (370 lb 9.6 oz)     in February she reached 298# but since then seems to be the same     Has been doing exercising outside, walking more    Feels has kind of plateaued since February    Has cut out dairy since January and no wheat since March  Drinking plenty of water     Depression Followup    How are you doing with your depression since your last visit? Improved     Are you having other symptoms that might be associated with depression? No    Have you had a significant life event?  No     Just focused on self now and living off of savings and going to school for her masters     Going on [employer] for 6 weeks    Are you feeling anxious or having panic attacks?   No    Do you have any concerns with your use of alcohol or other drugs? No    Social History     Tobacco Use     Smoking status: Never Smoker     Smokeless tobacco:  "Never Used   Substance Use Topics     Alcohol use: No     Drug use: No     PHQ 5/21/2021 1/12/2022 6/8/2022   PHQ-9 Total Score 2 11 3   Q9: Thoughts of better off dead/self-harm past 2 weeks Not at all Not at all Not at all     MAG-7 SCORE 1/12/2022 6/8/2022   Total Score 8 (mild anxiety) 9 (mild anxiety)   Total Score 8 9     Last PHQ-9 6/8/2022   1.  Little interest or pleasure in doing things 0   2.  Feeling down, depressed, or hopeless 0   3.  Trouble falling or staying asleep, or sleeping too much 1   4.  Feeling tired or having little energy 1   5.  Poor appetite or overeating 1   6.  Feeling bad about yourself 0   7.  Trouble concentrating 0   8.  Moving slowly or restless 0   Q9: Thoughts of better off dead/self-harm past 2 weeks 0   PHQ-9 Total Score 3     MAG-7  6/8/2022   1. Feeling nervous, anxious, or on edge 1   2. Not being able to stop or control worrying 1   3. Worrying too much about different things 1   4. Trouble relaxing 1   5. Being so restless that it is hard to sit still 1   6. Becoming easily annoyed or irritable 1   7. Feeling afraid, as if something awful might happen 3   MAG-7 Total Score 9       Suicide Assessment Five-step Evaluation and Treatment (SAFE-T)          Review of Systems    Constitutional, HEENT, cardiovascular, pulmonary, gi and gu systems are negative, except as otherwise noted.    Objective   /80   Pulse 81   Temp 99  F (37.2  C)   Resp 30   Ht 1.727 m (5' 8\")   Wt 138.3 kg (304 lb 12.8 oz)   LMP 06/02/2022   SpO2 99%   Breastfeeding No   BMI 46.34 kg/m    Body mass index is 46.34 kg/m .    Physical Exam  GENERAL APPEARANCE: healthy, alert and no distress  NECK: no adenopathy, no asymmetry, masses, or scars and thyroid normal to palpation  RESP: lungs clear to auscultation - no rales, rhonchi or wheezes  CV: regular rates and rhythm, normal S1 S2, no S3 or S4 and no murmur, click or rub  ABDOMEN: soft, nontender, without hepatosplenomegaly or masses, bowel " sounds normal and obese  MS: extremities normal- no gross deformities noted  SKIN: no suspicious lesions or rashes  NEURO: Normal strength and tone, mentation intact and speech normal  PSYCH: mentation appears normal and affect normal/bright    Diagnostic Test Results:  none      Chart documentation with Dragon Voice recognition Software. Although reviewed after completion, some words and grammatical errors may remain.

## 2022-06-09 NOTE — PATIENT INSTRUCTIONS
Morbid obesity (H)  Chronic, improving.  Patient continues to lose weight with phentermine and topiramate combination.  Down 13 pounds since last office visit, however patient feels she has plateaued in the last couple of months.  Is fairly active and eating healthy.  Did discuss creasing topiramate, is on the max dose of phentermine.  Patient would like to trial.  We will have patient increase to 1-1/2 tablets in the morning and 1 tablet at supper and may increase to 1-1/2 tablets at supper after a week if needed.  Patient to follow-up in clinic in approximately 3 months as she is leaving the country for follow-up.  - phentermine (ADIPEX-P) 37.5 MG tablet; Take 1 tablet (37.5 mg) by mouth every morning (before breakfast)  - topiramate (TOPAMAX) 50 MG tablet; Take 1.5 tablets (75 mg) by mouth every morning AND 1 tablet (50 mg) daily (with dinner). Can increase to 75 mg two times daily after 1 week if needed    Mild major depression (H)  Chronic, stable.  Doing well on Wellbutrin and Lexapro.  Continue medications without any changes.  - buPROPion (WELLBUTRIN XL) 150 MG 24 hr tablet; TAKE 1 TABLET BY MOUTH IN THE MORNING  - escitalopram (LEXAPRO) 20 MG tablet; Take 1 tablet (20 mg) by mouth daily

## 2022-08-14 ENCOUNTER — HEALTH MAINTENANCE LETTER (OUTPATIENT)
Age: 32
End: 2022-08-14

## 2022-09-15 ENCOUNTER — IMMUNIZATION (OUTPATIENT)
Dept: FAMILY MEDICINE | Facility: CLINIC | Age: 32
End: 2022-09-15
Payer: COMMERCIAL

## 2022-09-15 PROCEDURE — 0134A COVID-19,PF,MODERNA BIVALENT: CPT

## 2022-09-15 PROCEDURE — 91313 COVID-19,PF,MODERNA BIVALENT: CPT

## 2022-11-19 ENCOUNTER — HEALTH MAINTENANCE LETTER (OUTPATIENT)
Age: 32
End: 2022-11-19

## 2023-01-03 ENCOUNTER — MYC REFILL (OUTPATIENT)
Dept: FAMILY MEDICINE | Facility: CLINIC | Age: 33
End: 2023-01-03

## 2023-01-03 DIAGNOSIS — F32.0 MILD MAJOR DEPRESSION (H): ICD-10-CM

## 2023-01-03 DIAGNOSIS — E66.01 MORBID OBESITY (H): ICD-10-CM

## 2023-01-05 NOTE — TELEPHONE ENCOUNTER
"Requested Prescriptions   Pending Prescriptions Disp Refills    buPROPion (WELLBUTRIN XL) 150 MG 24 hr tablet 90 tablet 0     Sig: TAKE 1 TABLET BY MOUTH IN THE MORNING       SSRIs Protocol Failed - 1/3/2023  5:46 PM        Failed - PHQ-9 score less than 5 in past 6 months     Please review last PHQ-9 score.           Failed - Recent (6 mo) or future (30 days) visit within the authorizing provider's specialty     Patient had office visit in the last 6 months or has a visit in the next 30 days with authorizing provider or within the authorizing provider's specialty.  See \"Patient Info\" tab in inbasket, or \"Choose Columns\" in Meds & Orders section of the refill encounter.            Passed - Medication is Bupropion     If the medication is Bupropion (Wellbutrin), and the patient is taking for smoking cessation; OK to refill.          Passed - Medication is active on med list        Passed - Patient is age 18 or older        Passed - No active pregnancy on record        Passed - No positive pregnancy test in last 12 months          phentermine (ADIPEX-P) 37.5 MG tablet 90 tablet 0     Sig: Take 1 tablet (37.5 mg) by mouth every morning (before breakfast)       There is no refill protocol information for this order       topiramate (TOPAMAX) 50 MG tablet 270 tablet 0     Sig: Take 1.5 tablets (75 mg) by mouth every morning AND 1 tablet (50 mg) daily (with dinner). Can increase to 75 mg two times daily after 1 week if needed       Anti-Seizure Meds Protocol  Failed - 1/3/2023  5:46 PM        Failed - Review Authorizing provider's last note.      Refer to last progress notes: confirm request is for original authorizing provider (cannot be through other providers).          Passed - Recent (12 mo) or future (30 days) visit within the authorizing provider's specialty     Patient has had an office visit with the authorizing provider or a provider within the authorizing providers department within the previous 12 mos or has a " "future within next 30 days. See \"Patient Info\" tab in inbasket, or \"Choose Columns\" in Meds & Orders section of the refill encounter.              Passed - Normal CBC on file in past 26 months     Recent Labs   Lab Test 04/21/21  1723   WBC 9.7   RBC 4.56   HGB 13.0   HCT 40.3                    Passed - Normal ALT or AST on file in past 26 months     Recent Labs   Lab Test 04/21/21  1723   ALT 23     Recent Labs   Lab Test 04/21/21  1723   AST 11             Passed - Normal platelet count on file in past 26 months     Recent Labs   Lab Test 04/21/21  1723                  Passed - Medication is active on med list        Passed - No active pregnancy on record        Passed - No positive pregnancy test in last 12 months         ]  "

## 2023-01-06 RX ORDER — BUPROPION HYDROCHLORIDE 150 MG/1
150 TABLET ORAL EVERY MORNING
Qty: 30 TABLET | Refills: 0 | Status: SHIPPED | OUTPATIENT
Start: 2023-01-06 | End: 2023-02-24

## 2023-01-06 RX ORDER — PHENTERMINE HYDROCHLORIDE 37.5 MG/1
37.5 TABLET ORAL
Qty: 30 TABLET | Refills: 0 | Status: SHIPPED | OUTPATIENT
Start: 2023-01-06 | End: 2023-03-29

## 2023-01-06 RX ORDER — TOPIRAMATE 50 MG/1
TABLET, FILM COATED ORAL
Qty: 75 TABLET | Refills: 0 | Status: SHIPPED | OUTPATIENT
Start: 2023-01-06 | End: 2023-03-29

## 2023-03-23 ASSESSMENT — ENCOUNTER SYMPTOMS
CONSTIPATION: 0
SHORTNESS OF BREATH: 0
ARTHRALGIAS: 0
HEADACHES: 0
NAUSEA: 0
HEMATOCHEZIA: 0
FREQUENCY: 0
JOINT SWELLING: 0
PARESTHESIAS: 0
HEARTBURN: 0
BREAST MASS: 0
MYALGIAS: 0
DIARRHEA: 0
DIZZINESS: 0
SORE THROAT: 0
FEVER: 0
CHILLS: 0
DYSURIA: 0
PALPITATIONS: 0
ABDOMINAL PAIN: 0
EYE PAIN: 0
WEAKNESS: 0
NERVOUS/ANXIOUS: 1
HEMATURIA: 0
COUGH: 0

## 2023-03-23 ASSESSMENT — PATIENT HEALTH QUESTIONNAIRE - PHQ9
SUM OF ALL RESPONSES TO PHQ QUESTIONS 1-9: 14
10. IF YOU CHECKED OFF ANY PROBLEMS, HOW DIFFICULT HAVE THESE PROBLEMS MADE IT FOR YOU TO DO YOUR WORK, TAKE CARE OF THINGS AT HOME, OR GET ALONG WITH OTHER PEOPLE: VERY DIFFICULT
SUM OF ALL RESPONSES TO PHQ QUESTIONS 1-9: 14

## 2023-03-29 ENCOUNTER — OFFICE VISIT (OUTPATIENT)
Dept: FAMILY MEDICINE | Facility: CLINIC | Age: 33
End: 2023-03-29
Payer: COMMERCIAL

## 2023-03-29 VITALS
HEIGHT: 68 IN | BODY MASS INDEX: 44.41 KG/M2 | DIASTOLIC BLOOD PRESSURE: 70 MMHG | SYSTOLIC BLOOD PRESSURE: 120 MMHG | WEIGHT: 293 LBS | HEART RATE: 83 BPM | OXYGEN SATURATION: 100 % | TEMPERATURE: 98.2 F | RESPIRATION RATE: 16 BRPM

## 2023-03-29 DIAGNOSIS — F32.0 MILD MAJOR DEPRESSION (H): ICD-10-CM

## 2023-03-29 DIAGNOSIS — Z00.00 ROUTINE GENERAL MEDICAL EXAMINATION AT A HEALTH CARE FACILITY: Primary | ICD-10-CM

## 2023-03-29 DIAGNOSIS — E66.01 MORBID OBESITY (H): ICD-10-CM

## 2023-03-29 LAB
ANION GAP SERPL CALCULATED.3IONS-SCNC: 9 MMOL/L (ref 7–15)
BUN SERPL-MCNC: 13.7 MG/DL (ref 6–20)
CALCIUM SERPL-MCNC: 9 MG/DL (ref 8.6–10)
CHLORIDE SERPL-SCNC: 105 MMOL/L (ref 98–107)
CREAT SERPL-MCNC: 1.01 MG/DL (ref 0.51–0.95)
DEPRECATED HCO3 PLAS-SCNC: 25 MMOL/L (ref 22–29)
GFR SERPL CREATININE-BSD FRML MDRD: 75 ML/MIN/1.73M2
GLUCOSE SERPL-MCNC: 99 MG/DL (ref 70–99)
POTASSIUM SERPL-SCNC: 3.9 MMOL/L (ref 3.4–5.3)
SODIUM SERPL-SCNC: 139 MMOL/L (ref 136–145)

## 2023-03-29 PROCEDURE — 99214 OFFICE O/P EST MOD 30 MIN: CPT | Mod: 25 | Performed by: NURSE PRACTITIONER

## 2023-03-29 PROCEDURE — 36415 COLL VENOUS BLD VENIPUNCTURE: CPT | Performed by: NURSE PRACTITIONER

## 2023-03-29 PROCEDURE — 80048 BASIC METABOLIC PNL TOTAL CA: CPT | Performed by: NURSE PRACTITIONER

## 2023-03-29 PROCEDURE — 99395 PREV VISIT EST AGE 18-39: CPT | Performed by: NURSE PRACTITIONER

## 2023-03-29 PROCEDURE — 82306 VITAMIN D 25 HYDROXY: CPT | Performed by: NURSE PRACTITIONER

## 2023-03-29 RX ORDER — PHENTERMINE HYDROCHLORIDE 37.5 MG/1
37.5 TABLET ORAL
Qty: 30 TABLET | Refills: 0 | Status: SHIPPED | OUTPATIENT
Start: 2023-03-29 | End: 2023-06-16

## 2023-03-29 RX ORDER — TOPIRAMATE 50 MG/1
TABLET, FILM COATED ORAL
Qty: 72 TABLET | Refills: 0 | Status: SHIPPED | OUTPATIENT
Start: 2023-03-29 | End: 2023-06-16

## 2023-03-29 RX ORDER — BUPROPION HYDROCHLORIDE 150 MG/1
150 TABLET ORAL EVERY MORNING
Qty: 90 TABLET | Refills: 3 | Status: SHIPPED | OUTPATIENT
Start: 2023-03-29

## 2023-03-29 RX ORDER — ESCITALOPRAM OXALATE 20 MG/1
TABLET ORAL
Qty: 39 TABLET | Refills: 0 | Status: SHIPPED | OUTPATIENT
Start: 2023-03-29 | End: 2023-06-16

## 2023-03-29 ASSESSMENT — ENCOUNTER SYMPTOMS
NAUSEA: 0
COUGH: 0
EYE PAIN: 0
ABDOMINAL PAIN: 0
PARESTHESIAS: 0
CHILLS: 0
FEVER: 0
WEAKNESS: 0
SORE THROAT: 0
DIZZINESS: 0
CONSTIPATION: 0
MYALGIAS: 0
BREAST MASS: 0
FREQUENCY: 0
DYSURIA: 0
SHORTNESS OF BREATH: 0
HEMATURIA: 0
JOINT SWELLING: 0
HEARTBURN: 0
NERVOUS/ANXIOUS: 1
HEADACHES: 0
ARTHRALGIAS: 0
HEMATOCHEZIA: 0
DIARRHEA: 0
PALPITATIONS: 0

## 2023-03-29 ASSESSMENT — PAIN SCALES - GENERAL: PAINLEVEL: NO PAIN (0)

## 2023-03-29 NOTE — PATIENT INSTRUCTIONS
(F32.0) Mild major depression (H)  Comment: Chronic, stable. Has been out of medication for approximately 6 weeks. Doesn't feel any different. Denied side effects. Not sure she wants to go back on. After shared decision making - will restart Bupropion and Lexapro  Check labs  Follow-up in 1 month for recheck with weight check as below.  Plan: buPROPion (WELLBUTRIN XL) 150 MG 24 hr tablet,         escitalopram (LEXAPRO) 20 MG tablet, Basic         metabolic panel  (Ca, Cl, CO2, Creat, Gluc, K,         Na, BUN), Vitamin D Deficiency          (E66.01) Morbid obesity (H)  Comment: Chronic, has been working on weight loss. Has been on Phentermine and Topamax with good results, has been out since February. Has gained weight back. Was helping appetite   Restart both medications  Follow-up in 1 month  Plan: phentermine (ADIPEX-P) 37.5 MG tablet,         topiramate (TOPAMAX) 50 MG tablet, Adult         Comprehensive Weight Management          Referral        Preventive Health Recommendations  Female Ages 26 - 39  Yearly exam:   See your health care provider every year in order to  Review health changes.   Discuss preventive care.    Review your medicines if you your doctor has prescribed any.    Until age 30: Get a Pap test every three years (more often if you have had an abnormal result).    After age 30: Talk to your doctor about whether you should have a Pap test every 3 years or have a Pap test with HPV screening every 5 years.   You do not need a Pap test if your uterus was removed (hysterectomy) and you have not had cancer.  You should be tested each year for STDs (sexually transmitted diseases), if you're at risk.   Talk to your provider about how often to have your cholesterol checked.  If you are at risk for diabetes, you should have a diabetes test (fasting glucose).  Shots: Get a flu shot each year. Get a tetanus shot every 10 years.   Nutrition:   Eat at least 5 servings of fruits and vegetables each  day.  Eat whole-grain bread, whole-wheat pasta and brown rice instead of white grains and rice.  Get adequate Calcium and Vitamin D.     Lifestyle  Exercise at least 150 minutes a week (30 minutes a day, 5 days of the week). This will help you control your weight and prevent disease.  Limit alcohol to one drink per day.  No smoking.   Wear sunscreen to prevent skin cancer.  See your dentist every six months for an exam and cleaning.

## 2023-03-29 NOTE — PROGRESS NOTES
SUBJECTIVE:   CC: Jeannie is an 32 year old who presents for preventive health visit.   No flowsheet data found.  Patient has been advised of split billing requirements and indicates understanding: Yes  Healthy Habits:     Getting at least 3 servings of Calcium per day:  Yes    Bi-annual eye exam:  NO    Dental care twice a year:  NO    Sleep apnea or symptoms of sleep apnea:  Sleep apnea    Diet:  Other    Frequency of exercise:  1 day/week    Duration of exercise:  15-30 minutes    Taking medications regularly:  Yes    Medication side effects:  None    PHQ-2 Total Score: 3    Additional concerns today:  Yes    Medication Followup of Phentermine and Topiramate    Taking Medication as prescribed: NO-ran out of medication    Side Effects:  None    Medication Helping Symptoms:    Wt Readings from Last 4 Encounters:   03/29/23 145.6 kg (321 lb)   06/08/22 138.3 kg (304 lb 12.8 oz)   01/12/22 143.8 kg (317 lb)   08/30/21 (!) 157.9 kg (348 lb)                Depression and Anxiety Follow-Up    How are you doing with your depression since your last visit? Has been off x 6 weeks - would like to discuss    How are you doing with your anxiety since your last visit?  No change    Are you having other symptoms that might be associated with depression or anxiety? Doesn't know what normal is - has been on medications for a long time    Have you had a significant life event? OTHER: new job - for the good     Do you have any concerns with your use of alcohol or other drugs? No    Doesn't feel any different than when she was on    Social History     Tobacco Use     Smoking status: Never     Smokeless tobacco: Never   Vaping Use     Vaping status: Never Used   Substance Use Topics     Alcohol use: No     Drug use: No         1/12/2022     7:49 AM 6/8/2022     2:15 PM 3/23/2023     5:06 PM   PHQ   PHQ-9 Total Score 11 3 14   Q9: Thoughts of better off dead/self-harm past 2 weeks Not at all Not at all Not at all         1/12/2022      7:49 AM 6/8/2022     2:16 PM   MAG-7 SCORE   Total Score 8 (mild anxiety) 9 (mild anxiety)   Total Score 8 9         Suicide Assessment Five-step Evaluation and Treatment (SAFE-T)      Today's PHQ-2 Score:       3/23/2023     5:06 PM   PHQ-2 ( 1999 [employer])   Q1: Little interest or pleasure in doing things 1   Q2: Feeling down, depressed or hopeless 2   PHQ-2 Score 3   Q1: Little interest or pleasure in doing things Several days   Q2: Feeling down, depressed or hopeless More than half the days   PHQ-2 Score 3           Social History     Tobacco Use     Smoking status: Never     Smokeless tobacco: Never   Vaping Use     Vaping status: Never Used   Substance Use Topics     Alcohol use: No             3/23/2023     5:06 PM   Alcohol Use   Prescreen: >3 drinks/day or >7 drinks/week? No     Reviewed orders with patient.  Reviewed health maintenance and updated orders accordingly - Yes  Labs reviewed in EPIC  BP Readings from Last 3 Encounters:   03/29/23 120/70   06/08/22 118/80   01/12/22 124/78    Wt Readings from Last 3 Encounters:   03/29/23 145.6 kg (321 lb)   06/08/22 138.3 kg (304 lb 12.8 oz)   01/12/22 143.8 kg (317 lb)                  Patient Active Problem List   Diagnosis     Morbid obesity (H)     Mild major depression (H)     FABIAN (obstructive sleep apnea)     Past Surgical History:   Procedure Laterality Date     SURGICAL HISTORY OF -       None     [facility] SLEEP STUDY, UNATTENDED, RECORD HEART RATE/O2 SAT/RESP ANAL/SLEEP TM  9/29/2021            Social History     Tobacco Use     Smoking status: Never     Smokeless tobacco: Never   Vaping Use     Vaping status: Never Used   Substance Use Topics     Alcohol use: No     Family History   Problem Relation Age of Onset     Obesity Mother      Hypertension Mother      Other Cancer Mother         skin cancer melanoma     Anxiety Disorder Mother      C.A.D. Father      Diabetes Father      Hypertension Father      Obesity Father      Sleep Apnea Father      Mental  Illness Father         PTSD     Sleep Apnea Brother      Anxiety Disorder Brother      RAQUEL. Paternal Grandfather      Musculoskeletal Disorder Other         MS     Breast Cancer Other      Breast Cancer Other          Current Outpatient Medications   Medication Sig Dispense Refill     buPROPion (WELLBUTRIN XL) 150 MG 24 hr tablet Take 1 tablet (150 mg) by mouth every morning 90 tablet 3     escitalopram (LEXAPRO) 20 MG tablet Take 0.5 tablets (10 mg) by mouth daily for 5 days, THEN 1 tablet (20 mg) daily for 5 days, THEN 1.5 tablets (30 mg) daily for 21 days. 39 tablet 0     ORDER FROM ORDER PANEL Cpap       phentermine (ADIPEX-P) 37.5 MG tablet Take 1 tablet (37.5 mg) by mouth every morning (before breakfast) 30 tablet 0     topiramate (TOPAMAX) 50 MG tablet Take 1 tablet (50 mg) by mouth daily for 7 days, THEN 1 tablet (50 mg) 2 times daily for 7 days, THEN 1.5 tablets (75 mg) 2 times daily for 17 days. 72 tablet 0     vitamin D3 (CHOLECALCIFEROL) 1.25 MG (69524 UT) capsule Take 1 capsule (50,000 Units) by mouth every 7 days for 8 doses 8 capsule 0     No Known Allergies    Breast Cancer Screenin/21/2021     4:03 PM   Breast CA Risk Assessment (FHS-7)   Do you have a family history of breast, colon, or ovarian cancer? No / Unknown       Patient under 40 years of age: Routine Mammogram Screening not recommended.   Pertinent mammograms are reviewed under the imaging tab.    History of abnormal Pap smear: NO - age 30-65 PAP every 5 years with negative HPV co-testing recommended      Latest Ref Rng & Units 2021     5:14 PM 2021     4:45 PM   PAP / HPV   PAP (Historical)  NIL      HPV 16 DNA NEG^Negative  Negative     HPV 18 DNA NEG^Negative  Negative     Other HR HPV NEG^Negative  Negative       Reviewed and updated as needed this visit by clinical staff   Tobacco  Allergies  Meds  Problems  Med Hx  Surg Hx  Fam Hx          Reviewed and updated as needed this visit by Provider   Tobacco   "Allergies  Meds  Problems  Med Hx  Surg Hx  Fam Hx         Past Medical History:   Diagnosis Date     Depressive disorder Teenage years    Currently taking Lexapro for depression and anxiety      Past Surgical History:   Procedure Laterality Date     SURGICAL HISTORY OF -       None     [facility] SLEEP STUDY, UNATTENDED, RECORD HEART RATE/O2 SAT/RESP ANAL/SLEEP TM  9/29/2021            Review of Systems   Constitutional: Negative for chills and fever.   HENT: Negative for congestion, ear pain, hearing loss and sore throat.    Eyes: Negative for pain and visual disturbance.   Respiratory: Negative for cough and shortness of breath.    Cardiovascular: Negative for chest pain, palpitations and peripheral edema.   Gastrointestinal: Negative for abdominal pain, constipation, diarrhea, heartburn, hematochezia and nausea.   Breasts:  Negative for tenderness, breast mass and discharge.   Genitourinary: Negative for dysuria, frequency, genital sores, hematuria, pelvic pain, urgency, vaginal bleeding and vaginal discharge.   Musculoskeletal: Negative for arthralgias, joint swelling and myalgias.   Skin: Negative for rash.   Neurological: Negative for dizziness, weakness, headaches and paresthesias.   Psychiatric/Behavioral: Negative for mood changes. The patient is nervous/anxious.         OBJECTIVE:   /70   Pulse 83   Temp 98.2  F (36.8  C) (Tympanic)   Resp 16   Ht 1.734 m (5' 8.25\")   Wt 145.6 kg (321 lb)   LMP 03/04/2023   SpO2 100%   BMI 48.45 kg/m    Physical Exam  GENERAL: healthy, alert and no distress  EYES: Eyes grossly normal to inspection, PERRL and conjunctivae and sclerae normal  HENT: ear canals and TM's normal, nose and mouth without ulcers or lesions  NECK: no adenopathy, no asymmetry, masses, or scars and thyroid normal to palpation  RESP: lungs clear to auscultation - no rales, rhonchi or wheezes  BREAST: normal without masses, tenderness or nipple discharge and no palpable axillary masses or " adenopathy  CV: regular rate and rhythm, normal S1 S2, no S3 or S4, no murmur, click or rub, no peripheral edema and peripheral pulses strong  ABDOMEN: soft, nontender, no hepatosplenomegaly, no masses and bowel sounds normal  MS: no gross musculoskeletal defects noted, no edema  SKIN: no suspicious lesions or rashes  NEURO: Normal strength and tone, mentation intact and speech normal  PSYCH: mentation appears normal, affect normal/bright    Diagnostic Test Results:  Labs reviewed in Epic  none     ASSESSMENT/PLAN:   (Z00.00) Routine general medical examination at a health care facility  (primary encounter diagnosis)  Comment: Healthy 32 y.o. female in for routine annual exam.     (F32.0) Mild major depression (H)  Comment: Chronic, stable. Has been out of medication for approximately 6 weeks. Doesn't feel any different. Denied side effects. Not sure she wants to go back on. After shared decision making - will restart Bupropion and Lexapro  Check labs  Follow-up in 1 month for recheck with weight check as below.  Plan: buPROPion (WELLBUTRIN XL) 150 MG 24 hr tablet,         escitalopram (LEXAPRO) 20 MG tablet, Basic         metabolic panel  (Ca, Cl, CO2, Creat, Gluc, K,         Na, BUN), Vitamin D Deficiency          (E66.01) Morbid obesity (H)  Comment: Chronic, has been working on weight loss. Has been on Phentermine and Topamax with good results, has been out since February. Has gained weight back. Was helping appetite   Restart both medications  Follow-up in 1 month  Wt Readings from Last 3 Encounters:   03/29/23 145.6 kg (321 lb)   06/08/22 138.3 kg (304 lb 12.8 oz)   01/12/22 143.8 kg (317 lb)      Plan: phentermine (ADIPEX-P) 37.5 MG tablet,         topiramate (TOPAMAX) 50 MG tablet, Adult         Comprehensive Weight Management          Referral            Patient has been advised of split billing requirements and indicates understanding: Yes      COUNSELING:  Reviewed preventive health counseling,  "as reflected in patient instructions  After discussion with patient, patient verbalizes and agreeable to receive AVS instructions via My Chart, not printed today     BMI:   Estimated body mass index is 48.45 kg/m  as calculated from the following:    Height as of this encounter: 1.734 m (5' 8.25\").    Weight as of this encounter: 145.6 kg (321 lb).   Weight management plan: Discussed healthy diet and exercise guidelines Restarting Phentermine and Topomax      She reports that she has never smoked. She has never used smokeless tobacco.            Ivanna Hernandez, BELLE, APRN-CNP   [facility]    Chart documentation with Dragon Voice recognition Software. Although reviewed after completion, some words and grammatical errors may remain.   "

## 2023-03-29 NOTE — NURSING NOTE
"Chief Complaint   Patient presents with     Physical     /70   Pulse 83   Temp 98.2  F (36.8  C) (Tympanic)   Resp 16   Ht 1.734 m (5' 8.25\")   Wt 145.6 kg (321 lb)   LMP 03/04/2023   SpO2 100%   BMI 48.45 kg/m   Estimated body mass index is 48.45 kg/m  as calculated from the following:    Height as of this encounter: 1.734 m (5' 8.25\").    Weight as of this encounter: 145.6 kg (321 lb).  Patient presents to the clinic using No DME      Health Maintenance that is potentially due pending provider review:    Health Maintenance Due   Topic Date Due     DEPRESSION ACTION PLAN  Never done     HIV SCREENING  Never done     HEPATITIS C SCREENING  Never done     YEARLY PREVENTIVE VISIT  05/21/2022     INFLUENZA VACCINE (1) 09/01/2022        n/a        "

## 2023-03-30 LAB — DEPRECATED CALCIDIOL+CALCIFEROL SERPL-MC: 9 UG/L (ref 20–75)

## 2023-03-31 DIAGNOSIS — E55.9 VITAMIN D DEFICIENCY: Primary | ICD-10-CM

## 2023-06-13 ASSESSMENT — ANXIETY QUESTIONNAIRES
2. NOT BEING ABLE TO STOP OR CONTROL WORRYING: SEVERAL DAYS
4. TROUBLE RELAXING: MORE THAN HALF THE DAYS
5. BEING SO RESTLESS THAT IT IS HARD TO SIT STILL: SEVERAL DAYS
7. FEELING AFRAID AS IF SOMETHING AWFUL MIGHT HAPPEN: MORE THAN HALF THE DAYS
3. WORRYING TOO MUCH ABOUT DIFFERENT THINGS: SEVERAL DAYS
GAD7 TOTAL SCORE: 10
8. IF YOU CHECKED OFF ANY PROBLEMS, HOW DIFFICULT HAVE THESE MADE IT FOR YOU TO DO YOUR WORK, TAKE CARE OF THINGS AT HOME, OR GET ALONG WITH OTHER PEOPLE?: SOMEWHAT DIFFICULT
IF YOU CHECKED OFF ANY PROBLEMS ON THIS QUESTIONNAIRE, HOW DIFFICULT HAVE THESE PROBLEMS MADE IT FOR YOU TO DO YOUR WORK, TAKE CARE OF THINGS AT HOME, OR GET ALONG WITH OTHER PEOPLE: SOMEWHAT DIFFICULT
1. FEELING NERVOUS, ANXIOUS, OR ON EDGE: MORE THAN HALF THE DAYS
7. FEELING AFRAID AS IF SOMETHING AWFUL MIGHT HAPPEN: MORE THAN HALF THE DAYS
GAD7 TOTAL SCORE: 10
6. BECOMING EASILY ANNOYED OR IRRITABLE: SEVERAL DAYS

## 2023-06-16 ENCOUNTER — OFFICE VISIT (OUTPATIENT)
Dept: FAMILY MEDICINE | Facility: CLINIC | Age: 33
End: 2023-06-16
Attending: NURSE PRACTITIONER
Payer: COMMERCIAL

## 2023-06-16 VITALS
BODY MASS INDEX: 44.41 KG/M2 | HEART RATE: 71 BPM | DIASTOLIC BLOOD PRESSURE: 85 MMHG | RESPIRATION RATE: 30 BRPM | OXYGEN SATURATION: 98 % | TEMPERATURE: 100 F | SYSTOLIC BLOOD PRESSURE: 128 MMHG | HEIGHT: 68 IN | WEIGHT: 293 LBS

## 2023-06-16 DIAGNOSIS — E66.01 MORBID OBESITY (H): ICD-10-CM

## 2023-06-16 DIAGNOSIS — E55.9 VITAMIN D DEFICIENCY: ICD-10-CM

## 2023-06-16 DIAGNOSIS — F32.0 MILD MAJOR DEPRESSION (H): Primary | ICD-10-CM

## 2023-06-16 DIAGNOSIS — Z11.4 ENCOUNTER FOR SCREENING FOR HIV: ICD-10-CM

## 2023-06-16 DIAGNOSIS — Z11.59 NEED FOR HEPATITIS C SCREENING TEST: ICD-10-CM

## 2023-06-16 LAB
DEPRECATED CALCIDIOL+CALCIFEROL SERPL-MC: 37 UG/L (ref 20–75)
HCV AB SERPL QL IA: NONREACTIVE
HIV 1+2 AB+HIV1 P24 AG SERPL QL IA: NONREACTIVE

## 2023-06-16 PROCEDURE — 82306 VITAMIN D 25 HYDROXY: CPT | Performed by: NURSE PRACTITIONER

## 2023-06-16 PROCEDURE — 86803 HEPATITIS C AB TEST: CPT | Performed by: NURSE PRACTITIONER

## 2023-06-16 PROCEDURE — 99214 OFFICE O/P EST MOD 30 MIN: CPT | Performed by: NURSE PRACTITIONER

## 2023-06-16 PROCEDURE — 87389 HIV-1 AG W/HIV-1&-2 AB AG IA: CPT | Performed by: NURSE PRACTITIONER

## 2023-06-16 PROCEDURE — 36415 COLL VENOUS BLD VENIPUNCTURE: CPT | Performed by: NURSE PRACTITIONER

## 2023-06-16 RX ORDER — ESCITALOPRAM OXALATE 20 MG/1
30 TABLET ORAL DAILY
Qty: 135 TABLET | Refills: 1 | Status: SHIPPED | OUTPATIENT
Start: 2023-06-16

## 2023-06-16 ASSESSMENT — PATIENT HEALTH QUESTIONNAIRE - PHQ9
10. IF YOU CHECKED OFF ANY PROBLEMS, HOW DIFFICULT HAVE THESE PROBLEMS MADE IT FOR YOU TO DO YOUR WORK, TAKE CARE OF THINGS AT HOME, OR GET ALONG WITH OTHER PEOPLE: VERY DIFFICULT
SUM OF ALL RESPONSES TO PHQ QUESTIONS 1-9: 16
SUM OF ALL RESPONSES TO PHQ QUESTIONS 1-9: 16

## 2023-06-16 ASSESSMENT — PAIN SCALES - GENERAL: PAINLEVEL: NO PAIN (0)

## 2023-06-16 NOTE — PROGRESS NOTES
Assessment & Plan     Mild major depression (H)  Chronic, doing much better after starting medications, both escitalopram at 30 mg and Wellbutrin.  Denies any side effects.  We will continue without any changes.  - escitalopram (LEXAPRO) 20 MG tablet; Take 1.5 tablets (30 mg) by mouth daily    Morbid obesity (H)  Chronic, has been on phentermine, previously with very good results.  Most recently has not had much change in appetite, still wanting to eat a lot. Is up in weight.  Does have bariatric consult in October.  Discussed other options including Wegovy, patient would like to trial.  We will start with low-dose, 1 injection weekly.  Patient follow-up in 1 month in clinic for recheck.  - Semaglutide-Weight Management (WEGOVY) 0.25 MG/0.5ML pen; Inject 0.25 mg Subcutaneous once a week    Vitamin D deficiency  Previous vitamin D deficiency, level 9.  Completed course of high-dose vitamin D and has since been on 1000 international units daily.  We will recheck today.  - Vitamin D Deficiency; Future    Need for hepatitis C screening test  Reviewed CDC recommendations.  - Hepatitis C Screen Reflex to HCV RNA Quant and Genotype; Future    Encounter for screening for HIV  Reviewed CDC recommendations.  - HIV Antigen Antibody Combo; Future             See Patient Instructions After discussion with patient, patient verbalizes and agreeable to receive AVS instructions via My Chart, not printed today     No follow-ups on file.    Ivanna Montaño, DNP, APRN-CNP   [facility]    Subjective     Jeannie Russo is a 32 year old female who presents today for the following   health issues:    HPI  Answers for HPI/ROS submitted by the patient on 6/13/2023  MAG 7 TOTAL SCORE: 10  Depression/Anxiety: Depression & Anxiety  Status since last visit:: better  Anxiety since last: : medium  Other associated symptoms of depression:: No  Other associated symotome: : No  Significant life event: : No  Anxious::  Yes  Current substance use:: No  What is the reason for your visit today? : Follow up to last appointment  How many servings of fruits and vegetables do you eat daily?: 4 or more  On average, how many sweetened beverages do you drink each day (Examples: soda, juice, sweet tea, etc.  Do NOT count diet or artificially sweetened beverages)?: 0  How many minutes a day do you exercise enough to make your heart beat faster?: 30 to 60  How many days a week do you exercise enough to make your heart beat faster?: 3 or less  How many days per week do you miss taking your medication?: 3  What makes it hard for you to take your medication every day?: other    Vitals:    06/16/23 0822   Weight: 149.5 kg (329 lb 9.6 oz)     Is currently at 1.5 (75mg) of the Topiramate   Hasn't been taking consistently     Was 295 in September 22'  Working towards eating better, but is hungry all the time   Job is active and 1/2 and 1/2 sitting/standing   Does some outside walking     Did get scheduled with bariatric surgery in October (soonest)    Wt Readings from Last 4 Encounters:   06/16/23 149.5 kg (329 lb 9.6 oz)   03/29/23 145.6 kg (321 lb)   06/08/22 138.3 kg (304 lb 12.8 oz)   01/12/22 143.8 kg (317 lb)     Vitamin D deficiency  Did 8 week high dose and now 1,000 international unit(s) daily   D was 9      Social History     Tobacco Use     Smoking status: Never     Passive exposure: Never     Smokeless tobacco: Never   Vaping Use     Vaping status: Never Used   Substance Use Topics     Alcohol use: No     Drug use: No         6/8/2022     2:15 PM 3/23/2023     5:06 PM 6/16/2023     8:16 AM   PHQ   PHQ-9 Total Score 3 14 16   Q9: Thoughts of better off dead/self-harm past 2 weeks Not at all Not at all Several days   F/U: Thoughts of suicide or self-harm   No   F/U: Safety concerns   No         1/12/2022     7:49 AM 6/8/2022     2:16 PM 6/13/2023     7:03 PM   MAG-7 SCORE   Total Score 8 (mild anxiety) 9 (mild anxiety) 10 (moderate anxiety)  "  Total Score 8 9 10         6/16/2023     8:16 AM   Last PHQ-9   1.  Little interest or pleasure in doing things 1   2.  Feeling down, depressed, or hopeless 1   3.  Trouble falling or staying asleep, or sleeping too much 2   4.  Feeling tired or having little energy 2   5.  Poor appetite or overeating 3   6.  Feeling bad about yourself 1   7.  Trouble concentrating 3   8.  Moving slowly or restless 2   Q9: Thoughts of better off dead/self-harm past 2 weeks 1   PHQ-9 Total Score 16   In the past two weeks have you had thoughts of suicide or self harm? No   Do you have concerns about your personal safety or the safety of others? No         6/13/2023     7:03 PM   MAG-7    1. Feeling nervous, anxious, or on edge 2   2. Not being able to stop or control worrying 1   3. Worrying too much about different things 1   4. Trouble relaxing 2   5. Being so restless that it is hard to sit still 1   6. Becoming easily annoyed or irritable 1   7. Feeling afraid, as if something awful might happen 2   MAG-7 Total Score 10   If you checked any problems, how difficult have they made it for you to do your work, take care of things at home, or get along with other people? Somewhat difficult       Suicide Assessment Five-step Evaluation and Treatment (SAFE-T)      Review of Systems    Constitutional, HEENT, cardiovascular, pulmonary, gi and gu systems are negative, except as otherwise noted.    Objective   /85   Pulse 71   Temp 100  F (37.8  C) (Oral)   Resp 30   Ht 1.727 m (5' 8\")   Wt 149.5 kg (329 lb 9.6 oz)   LMP 05/25/2023 (Exact Date)   SpO2 98%   Breastfeeding No   BMI 50.12 kg/m    Body mass index is 50.12 kg/m .    Physical Exam  GENERAL APPEARANCE: healthy, alert and no distress  NECK: no adenopathy, no asymmetry, masses, or scars and thyroid normal to palpation  RESP: lungs clear to auscultation - no rales, rhonchi or wheezes  CV: regular rates and rhythm, normal S1 S2, no S3 or S4 and no murmur, click or " rub  ABDOMEN: soft, nontender, without hepatosplenomegaly or masses, bowel sounds normal and obese  MS: extremities normal- no gross deformities noted  SKIN: no suspicious lesions or rashes  NEURO: Normal strength and tone, mentation intact and speech normal  PSYCH: mentation appears normal and affect normal/bright    Diagnostic Test Results:  Pending      Chart documentation with Dragon Voice recognition Software. Although reviewed after completion, some words and grammatical errors may remain.

## 2023-06-16 NOTE — PATIENT INSTRUCTIONS
Mild major depression (H)  Chronic, doing much better after starting medications, both escitalopram at 30 mg and Wellbutrin.  Denies any side effects.  We will continue without any changes.  - escitalopram (LEXAPRO) 20 MG tablet; Take 1.5 tablets (30 mg) by mouth daily    Morbid obesity (H)  Chronic, has been on phentermine, previously with very good results.  Most recently has not had much change in appetite, still wanting to eat a lot. Is up in weight.  Does have bariatric consult in October.  Discussed other options including Wegovy, patient would like to trial.  We will start with low-dose, 1 injection weekly.  Patient follow-up in 1 month in clinic for recheck.  - Semaglutide-Weight Management (WEGOVY) 0.25 MG/0.5ML pen; Inject 0.25 mg Subcutaneous once a week    Vitamin D deficiency  Previous vitamin D deficiency, level 9.  Completed course of high-dose vitamin D and has since been on 1000 international units daily.  We will recheck today.  - Vitamin D Deficiency; Future

## 2023-07-29 DIAGNOSIS — E66.01 MORBID OBESITY (H): ICD-10-CM

## 2023-08-01 NOTE — TELEPHONE ENCOUNTER
Left message on unidentified voicemail to return call. What is patient's current weight?   Last Written Prescription Date:  06/16/23  Last Fill Quantity: 2 ml,  # refills: 0   Last office visit: 6/16/2023 ; last virtual visit: Visit date not found with prescribing provider:     Future Office Visit:      Routing refill request to provider for review/approval because:  Drug not on the [facility] refill protocol     Carol OLSEN RN

## 2023-08-13 ENCOUNTER — MYC MEDICAL ADVICE (OUTPATIENT)
Dept: FAMILY MEDICINE | Facility: CLINIC | Age: 33
End: 2023-08-13
Payer: COMMERCIAL

## 2023-08-13 DIAGNOSIS — E66.01 MORBID OBESITY (H): Primary | ICD-10-CM

## 2023-08-16 RX ORDER — TOPIRAMATE 25 MG/1
TABLET, FILM COATED ORAL
Qty: 110 TABLET | Refills: 0 | Status: SHIPPED | OUTPATIENT
Start: 2023-08-16 | End: 2023-09-11

## 2023-08-16 RX ORDER — PHENTERMINE HYDROCHLORIDE 37.5 MG/1
37.5 TABLET ORAL
Qty: 30 TABLET | Refills: 0 | Status: SHIPPED | OUTPATIENT
Start: 2023-08-16 | End: 2023-09-11

## 2023-09-10 ENCOUNTER — HEALTH MAINTENANCE LETTER (OUTPATIENT)
Age: 33
End: 2023-09-10

## 2023-09-10 ASSESSMENT — PATIENT HEALTH QUESTIONNAIRE - PHQ9
SUM OF ALL RESPONSES TO PHQ QUESTIONS 1-9: 10
SUM OF ALL RESPONSES TO PHQ QUESTIONS 1-9: 10
10. IF YOU CHECKED OFF ANY PROBLEMS, HOW DIFFICULT HAVE THESE PROBLEMS MADE IT FOR YOU TO DO YOUR WORK, TAKE CARE OF THINGS AT HOME, OR GET ALONG WITH OTHER PEOPLE: SOMEWHAT DIFFICULT

## 2023-09-10 ASSESSMENT — ANXIETY QUESTIONNAIRES
2. NOT BEING ABLE TO STOP OR CONTROL WORRYING: SEVERAL DAYS
7. FEELING AFRAID AS IF SOMETHING AWFUL MIGHT HAPPEN: NOT AT ALL
GAD7 TOTAL SCORE: 7
GAD7 TOTAL SCORE: 7
3. WORRYING TOO MUCH ABOUT DIFFERENT THINGS: SEVERAL DAYS
1. FEELING NERVOUS, ANXIOUS, OR ON EDGE: MORE THAN HALF THE DAYS
6. BECOMING EASILY ANNOYED OR IRRITABLE: SEVERAL DAYS
4. TROUBLE RELAXING: SEVERAL DAYS
IF YOU CHECKED OFF ANY PROBLEMS ON THIS QUESTIONNAIRE, HOW DIFFICULT HAVE THESE PROBLEMS MADE IT FOR YOU TO DO YOUR WORK, TAKE CARE OF THINGS AT HOME, OR GET ALONG WITH OTHER PEOPLE: SOMEWHAT DIFFICULT
5. BEING SO RESTLESS THAT IT IS HARD TO SIT STILL: SEVERAL DAYS

## 2023-09-11 ENCOUNTER — OFFICE VISIT (OUTPATIENT)
Dept: FAMILY MEDICINE | Facility: CLINIC | Age: 33
End: 2023-09-11
Payer: COMMERCIAL

## 2023-09-11 VITALS
HEART RATE: 74 BPM | HEIGHT: 68 IN | TEMPERATURE: 97.7 F | SYSTOLIC BLOOD PRESSURE: 120 MMHG | BODY MASS INDEX: 44.41 KG/M2 | RESPIRATION RATE: 18 BRPM | DIASTOLIC BLOOD PRESSURE: 80 MMHG | WEIGHT: 293 LBS | OXYGEN SATURATION: 99 %

## 2023-09-11 DIAGNOSIS — E66.01 MORBID OBESITY (H): ICD-10-CM

## 2023-09-11 PROCEDURE — 99213 OFFICE O/P EST LOW 20 MIN: CPT | Performed by: NURSE PRACTITIONER

## 2023-09-11 RX ORDER — TOPIRAMATE 50 MG/1
100 TABLET, FILM COATED ORAL 2 TIMES DAILY
Qty: 120 TABLET | Refills: 0 | Status: SHIPPED | OUTPATIENT
Start: 2023-09-11 | End: 2023-11-05

## 2023-09-11 RX ORDER — PHENTERMINE HYDROCHLORIDE 37.5 MG/1
37.5 TABLET ORAL
Qty: 30 TABLET | Refills: 0 | Status: SHIPPED | OUTPATIENT
Start: 2023-09-11 | End: 2023-11-05

## 2023-09-11 ASSESSMENT — PAIN SCALES - GENERAL: PAINLEVEL: NO PAIN (0)

## 2023-09-11 NOTE — PROGRESS NOTES
"  Assessment & Plan     Morbid obesity (H)  Chronic, started Phentermine and Topamax over a year ago and had a >100lb weight loss then hit a plateau. Does eat well and stays active. Discussed a trial of Wegovy and patient was unable to fill anywhere. Discussed going back on Phentermine and Topamax again and patient is agreeable as did help. Will restart and have patient recheck in 1 month for a weight check.   - phentermine (ADIPEX-P) 37.5 MG tablet; Take 1 tablet (37.5 mg) by mouth every morning (before breakfast)  - topiramate (TOPAMAX) 50 MG tablet; Take 2 tablets (100 mg) by mouth 2 times daily             BMI:   Estimated body mass index is 51.54 kg/m  as calculated from the following:    Height as of this encounter: 1.727 m (5' 8\").    Weight as of this encounter: 153.8 kg (339 lb).   Weight management plan: Discussed healthy diet and exercise guidelines and medication management    See Patient Instructions After discussion with patient, patient verbalizes and agreeable to receive AVS instructions via My Chart, not printed today     Ivanna Hernandez, BELLE, APRN-CNP   [facility]    Phoebe Dennis is a 32 year old, presenting for the following health issues:  Weight Check        9/11/2023     7:31 AM   Additional Questions   Roomed by Ale VALENTINE   Accompanied by self       History of Present Illness       Reason for visit:  Weight    She eats 4 or more servings of fruits and vegetables daily.She consumes 1 sweetened beverage(s) daily.She exercises with enough effort to increase her heart rate 20 to 29 minutes per day.  She exercises with enough effort to increase her heart rate 3 or less days per week. She is missing 1 dose(s) of medications per week.  She is not taking prescribed medications regularly due to remembering to take.       Medication Followup of Was unable to fill the Wegovy is on Phentermine and Topiramate  Taking Medication as prescribed: yes  Side Effects:  " "None  Medication Helping Symptoms:  Not helping    Wt Readings from Last 3 Encounters:   09/11/23 (!) 153.8 kg (339 lb)   06/16/23 149.5 kg (329 lb 9.6 oz)   03/29/23 145.6 kg (321 lb)      Was at #348 at home when started the Phentermine and Topiramate     October 16th appointment with Bariatric clinic    Not suppressing appetite like before     Review of Systems   Constitutional, HEENT, cardiovascular, pulmonary, gi and gu systems are negative, except as otherwise noted.      Objective    /80   Pulse 74   Temp 97.7  F (36.5  C) (Tympanic)   Resp 18   Ht 1.727 m (5' 8\")   Wt (!) 153.8 kg (339 lb)   LMP 08/20/2023 (Exact Date)   SpO2 99%   Breastfeeding No   BMI 51.54 kg/m    Body mass index is 51.54 kg/m .  Physical Exam   GENERAL APPEARANCE: healthy, alert, and no distress  RESP: lungs clear to auscultation - no rales, rhonchi or wheezes  CV: regular rates and rhythm, normal S1 S2, no S3 or S4, and no murmur, click or rub  ABDOMEN: soft, nontender, without hepatosplenomegaly or masses, bowel sounds normal, and obese  MS: extremities normal- no gross deformities noted  SKIN: no suspicious lesions or rashes  PSYCH: mentation appears normal and affect normal/bright    Diagnostic Test Results:  Labs reviewed in Epic  none          Chart documentation with Dragon Voice recognition Software. Although reviewed after completion, some words and grammatical errors may remain.         "

## 2023-09-17 NOTE — PATIENT INSTRUCTIONS
Morbid obesity (H)  Chronic, started Phentermine and Topamax over a year ago and had a >100lb weight loss then hit a plateau. Does eat well and stays active. Discussed a trial of Wegovy and patient was unable to fill anywhere. Discussed going back on Phentermine and Topamax again and patient is agreeable as did help. Will restart and have patient recheck in 1 month for a weight check.   - phentermine (ADIPEX-P) 37.5 MG tablet; Take 1 tablet (37.5 mg) by mouth every morning (before breakfast)  - topiramate (TOPAMAX) 50 MG tablet; Take 2 tablets (100 mg) by mouth 2 times daily

## 2023-10-15 NOTE — PROGRESS NOTES
"New Medical Weight Management Consult    PATIENT:  Jeannie Russo  MRN:         [id number]  :         1990  DROA:         10/16/2023      I had the pleasure of seeing your patient, Jeannie Russo. Full intake/assessment was done to determine barriers to weight loss success and develop a treatment plan. Jeannie Russo is a 33 year old female interested in treatment of medical problems associated with excess weight. She has a height of 5' 7\", a weight of 334 lbs 8 oz, and the calculated Body mass index is 52.39 kg/m .    Jeannie is a patient with mature onset class III, super morbid obesity with significant element of familial/genetic influence and with current health consequences. She does need aggressive weight loss plan due to family history of early heart disease.  Jeannie Russo eats a high fat diet, uses food as mood management, mostly eats during the evening, and has a disorganized meal pattern but has made some good changes of late and has been working with her PCP on medication assistance with phentermine and topiramate for some good success.       Her problem is complicated by strong craving/reward pathways, a binge eating component, and impaired metabolic perception      She comes to use already using appetite suppressant medications.  Phentermine and topamax both can impair appetite and help with weight reduction if paired with mindful and appropriate restricted diet.  She's been using medication since  again this summer and has found them to be well tolerated. Her bupropion as an antidepressant can have modest appetite suppressant effects, some interaction risk for excess stimulation with phentermine but case reports/retrospective publications show this is often well tolerated in many. Her experience thus far with this combination has been overall good, tolerated but incomplete and we discussed adjustment to her regimen..      Naltrexone could further augment bupropion's appetite " suppressant effects (essentially Contrave). We'll plan to ramp this up. Super morbid obesity persists (BMI>50) and we'll see how sensitive her weight is to non surgical tools this year before considering in more detail our surgical program.  She's not currently interested in surgical weight loss options.       We discussed a toolbox approach to weight management today and she is open to combining mindful, reduced calorie dietary therapy with increased mindfulness techniques, activity/exercise improvements to optimize their current and future health.  Medication assistance for appetite control was discussed today and on review of the risks/benefits for this patients health history, we've decided to start with appetite suppressant therapy.    ASSESSMENT AND PLAN  Problem List Items Addressed This Visit          Digestive    Morbid obesity (H)            60 minutes spent by me on the date of the encounter doing chart review, history and exam, documentation and further activities per the note        She has the following co-morbidities:        10/13/2023     5:38 PM   --   I have the following health issues associated with obesity Sleep Apnea   I have the following symptoms associated with obesity Depression    Fatigue   CPAP use is regular.  Sept of 2020, started taking lexapro/wellbutrin and had some benefit on appetite, started losing some weight from that, about 25 lbs in her estimation (didn't have a scale that went up to 400 lbs).  After new job change, went to new MD, started phentermine and topiramate in April of 2021. Does get some fingertip paresthesias with topamax at current doses but otherwise tolerated. Came off meds in March due to prescription lapse and had been at plateau of effect, weight gain of 40 lbs.  Ruth couldn't get        No data to display                    10/13/2023     5:38 PM   Referring Provider   Please name the provider who referred you to Medical Weight Management  If you do not know,  "please answer \"I Don't Know\" Ivanna           10/13/2023     5:38 PM   Weight History   How concerned are you about your weight? Very Concerned   I became overweight As a Teenager   The following factors have contributed to my weight gain Mental Health Issues    Eating Too Much    Lack of Exercise    Genetic (Runs in the Family)    Stress   I have tried the following methods to lose weight Watching Portions or Calories    Exercise    Atkins-type Diet (Low Carb/High Protein)    Medications    Fasting   My lowest weight since age 16 was 220   My highest weight since age 16 was 425   The most weight I have ever lost was (lbs) 125   I have the following family history of obesity/being overweight My mother is overweight    My father is overweight    One or more of my siblings are overweight    Many of my relatives are overweight   How has your weight changed over the last year? Gained   How many pounds? 40   Genetic and familial risks exist for obesity.  She's done well to be keeping over 60 lbs off from her lifetime heaviest weight, about a 12% reduction in total body weight.        10/13/2023     5:38 PM   Diet Recall Review with Patient   If you do eat supper, what types of food do you typically eat? Chicken or Pork, Vegetable Mix of peppers, onions, green beans in a Turkmen lopez sauce, with steamed white rice   If you do snack, what types of food do you typically eat? Two string cheese, two mandarin oranges, 15 pepperonis, handful of banana chips.   How many glasses of juice do you drink in a typical day? 0   How many of glasses of milk do you drink in a typical day? 0   How many 8oz glasses of sugar containing drinks such as Marquez-Aid/sweet tea do you drink in a day? 0   How many cans/bottles of sugar pop/soda/tea/sports drinks do you drink in a day? 0   How many cans/bottles of diet pop/soda/tea or sports drink do you drink in a day? 0   How often do you have a drink of alcohol? Monthly or Less   If you do drink, how " many drinks might you have in a day? 1 or 2   In her opinion, portion sizes at meals at night were a previous issues. Between meal intake she estimates is nonproblematic.  She's been following a diet for 4-5 months again this summer after a good year or two of weight reduction prior to coming off her phentermine.             10/13/2023     5:38 PM   Eating Habits   Generally, my meals include foods like these bread, pasta, rice, potatoes, corn, crackers, sweet dessert, pop, or juice Everyday   Generally, my meals include foods like these fried meats, brats, burgers, french fries, pizza, cheese, chips, or ice cream Once a Week   Eat fast food (like [employer], [employer], [employer]aco Bell) Once a Week   Eat at a buffet or sit-down restaurant Never   Eat most of my meals in front of the TV or computer Everyday   Often skip meals, eat at random times, have no regular eating times Everyday   Rarely sit down for a meal but snack or graze throughout A Few Times a Week   Eat extra snacks between meals Never   Eat most of my food at the end of the day Never   Eat in the middle of the night or wake up at night to eat Never   Eat extra snacks to prevent or correct low blood sugar Never   Eat to prevent acid reflux or stomach pain Never   Worry about not having enough food to eat Never   I eat when I am depressed A Few Times a Week   I eat when I am stressed A Few Times a Week   I eat when I am bored A Few Times a Week   I eat when I am anxious A Few Times a Week   I eat when I am happy or as a reward A Few Times a Week   I feel hungry all the time even if I just have eaten Everyday   Feeling full is important to me A Few Times a Week   I finish all the food on my plate even if I am already full Never   I can't resist eating delicious food or walk past the good food/smell Less Than Weekly   I eat/snack without noticing that I am eating A Few Times a Week   I eat when I am preparing the meal Less Than Weekly   I eat more than usual  when I see others eating Never   I have trouble not eating sweets, ice cream, cookies, or chips if they are around the house Almost Everyday   I think about food all day A Few Times a Week   What foods, if any, do you crave? Sweets/Candy/Chocolate   Please list any other foods you crave? Peanut Butter   Satiety signals may be impaired. On the go and distracted eating can be problematic for convenience/comfort based meals that can be highly palatable.        10/13/2023     5:38 PM   Amount of Food   I feel out of control when eating Never   I eat a large amount of food, like a loaf of bread, a box of cookies, a pint/quart of ice cream, all at once Never   I eat a large amount of food even when I am not hungry Never   I eat rapidly Never   I eat alone because I feel embarrassed and do not want others to see how much I have eaten Weekly   I eat until I am uncomfortably full Never   I feel bad, disgusted, or guilty after I overeat Weekly   Interest in evaluation for eating disorder?        10/13/2023     5:38 PM   Activity/Exercise History   How much of a typical 12 hour day do you spend sitting? Half the Day   How much of a typical 12 hour day do you spend lying down? Less Than Half the Day   How much of a typical day do you spend walking/standing? Less Than Half the Day   How many hours (not including work) do you spend on the TV/Video Games/Computer/Tablet/Phone? 4-5 Hours   How many times a week are you active for the purpose of exercise? 2-3 Times a Week   What keeps you from being more active? Too tired   How many total minutes do you spend doing some activity for the purpose of exercising when you exercise? 15-30 Minutes   Exercise and active hobbies include: has a love of soccer and goes to [employer] of [place] [employer]. .  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  Currently working in  ware house. Over 8 hour day, uses standing desk. 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 75%   How many hours do you spend commuting to work daily? 0.25   What is your marital status? Single   Who do you live with? Mother and Brother   Who does the food shopping? Online ordering           10/13/2023     5:38 PM   Mental Health History Reviewed With Patient   Have you ever been physically or sexually abused? No   How often in the past 2 weeks have you felt little interest or pleasure in doing things? For Several Days   Over the past 2 weeks how often have you felt down, depressed, or hopeless? For Several Days           10/13/2023     5:38 PM   Sleep History Reviewed With Patient   How many hours do you sleep at night? 6   Uses CPAP    Past Surgical History:   Procedure Laterality Date    SURGICAL HISTORY OF -       None    [facility] SLEEP STUDY, UNATTENDED, RECORD HEART RATE/O2 SAT/RESP ANAL/SLEEP TM  9/29/2021            Social History     Socioeconomic History    Marital status: Single     Spouse name: Not on file    Number of children: Not on file    Years of education: Not on file    Highest education level: Not on file   Occupational History    Not on file   Tobacco Use    Smoking status: Never     Passive exposure: Never    Smokeless tobacco: Never   Vaping Use    Vaping Use: Never used   Substance and Sexual Activity    Alcohol use: No    Drug use: No    Sexual activity: Never   Other Topics Concern     "Parent/sibling w/ CABG, MI or angioplasty before 65F 55M? Yes   Social History Narrative    Not on file     Social Determinants of Health     Financial Resource Strain: Not on file   Food Insecurity: Not on file   Transportation Needs: Not on file   Physical Activity: Not on file   Stress: Not on file   Social Connections: Not on file   Interpersonal Safety: Not on file   Housing Stability: Not on file       MEDICATIONS:   Current Outpatient Medications   Medication Sig Dispense Refill    buPROPion (WELLBUTRIN XL) 150 MG 24 hr tablet Take 1 tablet (150 mg) by mouth every morning 90 tablet 3    escitalopram (LEXAPRO) 20 MG tablet Take 1.5 tablets (30 mg) by mouth daily 135 tablet 1    phentermine (ADIPEX-P) 37.5 MG tablet Take 1 tablet (37.5 mg) by mouth every morning (before breakfast) 30 tablet 0    topiramate (TOPAMAX) 50 MG tablet Take 2 tablets (100 mg) by mouth 2 times daily 120 tablet 0    ORDER FROM ORDER PANEL Cpap         ALLERGIES:   No Known Allergies        PHYSICAL EXAM:  Objective    /82   Ht 1.702 m (5' 7\")   Wt (!) 151.7 kg (334 lb 8 oz)   LMP 08/20/2023 (Exact Date)   BMI 52.39 kg/m    /82   Ht 1.702 m (5' 7\")   Wt (!) 151.7 kg (334 lb 8 oz)   LMP 08/20/2023 (Exact Date)   BMI 52.39 kg/m    Body mass index is 52.39 kg/m .  Physical Exam   GENERAL: healthy, alert and no distress  NECK: no adenopathy, no asymmetry, masses, or scars and thyroid normal to palpation  RESP: lungs clear to auscultation - no rales, rhonchi or wheezes  CV: regular rate and rhythm, normal S1 S2, no S3 or S4, no murmur, click or rub, no peripheral edema and peripheral pulses strong  ABDOMEN: soft, nontender, no hepatosplenomegaly, no masses and bowel sounds normal  MS: no gross musculoskeletal defects noted, no edema        Sincerely,    John Orellana MD          "

## 2023-10-16 ENCOUNTER — VIRTUAL VISIT (OUTPATIENT)
Dept: SURGERY | Facility: CLINIC | Age: 33
End: 2023-10-16
Attending: NURSE PRACTITIONER
Payer: COMMERCIAL

## 2023-10-16 ENCOUNTER — LAB (OUTPATIENT)
Dept: LAB | Facility: CLINIC | Age: 33
End: 2023-10-16
Payer: COMMERCIAL

## 2023-10-16 VITALS
BODY MASS INDEX: 45.99 KG/M2 | DIASTOLIC BLOOD PRESSURE: 82 MMHG | HEIGHT: 67 IN | SYSTOLIC BLOOD PRESSURE: 138 MMHG | WEIGHT: 293 LBS

## 2023-10-16 DIAGNOSIS — E66.01 CLASS 2 SEVERE OBESITY DUE TO EXCESS CALORIES WITH SERIOUS COMORBIDITY IN ADULT, UNSPECIFIED BMI (H): ICD-10-CM

## 2023-10-16 DIAGNOSIS — E66.01 CLASS 3 SEVERE OBESITY DUE TO EXCESS CALORIES WITHOUT SERIOUS COMORBIDITY WITH BODY MASS INDEX (BMI) OF 50.0 TO 59.9 IN ADULT (H): Primary | ICD-10-CM

## 2023-10-16 DIAGNOSIS — E66.812 CLASS 2 SEVERE OBESITY DUE TO EXCESS CALORIES WITH SERIOUS COMORBIDITY IN ADULT, UNSPECIFIED BMI (H): ICD-10-CM

## 2023-10-16 DIAGNOSIS — Z71.3 NUTRITIONAL COUNSELING: ICD-10-CM

## 2023-10-16 DIAGNOSIS — E66.01 MORBID OBESITY (H): ICD-10-CM

## 2023-10-16 DIAGNOSIS — E66.813 CLASS 3 SEVERE OBESITY DUE TO EXCESS CALORIES WITHOUT SERIOUS COMORBIDITY WITH BODY MASS INDEX (BMI) OF 50.0 TO 59.9 IN ADULT (H): Primary | ICD-10-CM

## 2023-10-16 DIAGNOSIS — G47.33 OSA (OBSTRUCTIVE SLEEP APNEA): ICD-10-CM

## 2023-10-16 DIAGNOSIS — E53.8 VITAMIN B12 DEFICIENCY (NON ANEMIC): ICD-10-CM

## 2023-10-16 LAB
ALBUMIN SERPL BCG-MCNC: 4.1 G/DL (ref 3.5–5.2)
ALP SERPL-CCNC: 73 U/L (ref 35–104)
ALT SERPL W P-5'-P-CCNC: 5 U/L (ref 0–50)
ANION GAP SERPL CALCULATED.3IONS-SCNC: 12 MMOL/L (ref 7–15)
AST SERPL W P-5'-P-CCNC: 13 U/L (ref 0–45)
BILIRUB SERPL-MCNC: 0.8 MG/DL
BUN SERPL-MCNC: 11.3 MG/DL (ref 6–20)
CALCIUM SERPL-MCNC: 9.1 MG/DL (ref 8.6–10)
CHLORIDE SERPL-SCNC: 106 MMOL/L (ref 98–107)
CREAT SERPL-MCNC: 1.15 MG/DL (ref 0.51–0.95)
DEPRECATED HCO3 PLAS-SCNC: 21 MMOL/L (ref 22–29)
EGFRCR SERPLBLD CKD-EPI 2021: 63 ML/MIN/1.73M2
GLUCOSE SERPL-MCNC: 97 MG/DL (ref 70–99)
HBA1C MFR BLD: 5 % (ref 0–5.6)
POTASSIUM SERPL-SCNC: 3.9 MMOL/L (ref 3.4–5.3)
PROT SERPL-MCNC: 6.9 G/DL (ref 6.4–8.3)
PTH-INTACT SERPL-MCNC: 66 PG/ML (ref 15–65)
SODIUM SERPL-SCNC: 139 MMOL/L (ref 135–145)
TSH SERPL DL<=0.005 MIU/L-ACNC: 1.19 UIU/ML (ref 0.3–4.2)
VIT B12 SERPL-MCNC: 164 PG/ML (ref 232–1245)

## 2023-10-16 PROCEDURE — 97802 MEDICAL NUTRITION INDIV IN: CPT | Mod: VID | Performed by: DIETITIAN, REGISTERED

## 2023-10-16 PROCEDURE — 83036 HEMOGLOBIN GLYCOSYLATED A1C: CPT

## 2023-10-16 PROCEDURE — 82607 VITAMIN B-12: CPT

## 2023-10-16 PROCEDURE — 84443 ASSAY THYROID STIM HORMONE: CPT

## 2023-10-16 PROCEDURE — 99205 OFFICE O/P NEW HI 60 MIN: CPT | Performed by: EMERGENCY MEDICINE

## 2023-10-16 PROCEDURE — 36415 COLL VENOUS BLD VENIPUNCTURE: CPT

## 2023-10-16 PROCEDURE — 80053 COMPREHEN METABOLIC PANEL: CPT

## 2023-10-16 PROCEDURE — 83970 ASSAY OF PARATHORMONE: CPT

## 2023-10-16 RX ORDER — NALTREXONE HYDROCHLORIDE 50 MG/1
TABLET, FILM COATED ORAL
Qty: 90 TABLET | Refills: 0 | Status: SHIPPED | OUTPATIENT
Start: 2023-10-16

## 2023-10-16 NOTE — LETTER
"    10/16/2023         RE: Jeannie Russo  [address] [facility] [address]        Dear Colleague,    Thank you for referring your patient, Jeannie Russo, to the [facility]ATR[facility] [place]. Please see a copy of my visit note below.    New Medical Weight Management Consult    PATIENT:  Jeannie Russo  MRN:         [id number]  :         1990  DORA:         10/16/2023      I had the pleasure of seeing your patient, Jeannie Russo. Full intake/assessment was done to determine barriers to weight loss success and develop a treatment plan. Jeannie Russo is a 33 year old female interested in treatment of medical problems associated with excess weight. She has a height of 5' 7\", a weight of 334 lbs 8 oz, and the calculated Body mass index is 52.39 kg/m .    Jeannie is a patient with mature onset class III, super morbid obesity with significant element of familial/genetic influence and with current health consequences. She does need aggressive weight loss plan due to family history of early heart disease.  Jeannie Russo eats a high fat diet, uses food as mood management, mostly eats during the evening, and has a disorganized meal pattern but has made some good changes of late and has been working with her PCP on medication assistance with phentermine and topiramate for some good success.       Her problem is complicated by strong craving/reward pathways, a binge eating component, and impaired metabolic perception      She comes to use already using appetite suppressant medications.  Phentermine and topamax both can impair appetite and help with weight reduction if paired with mindful and appropriate restricted diet.  She's been using medication since  again this summer and has found them to be well tolerated. Her bupropion as an antidepressant can have modest appetite suppressant effects, some interaction risk for excess stimulation with phentermine but case " reports/retrospective publications show this is often well tolerated in many. Her experience thus far with this combination has been overall good, tolerated but incomplete and we discussed adjustment to her regimen..      Naltrexone could further augment bupropion's appetite suppressant effects (essentially Contrave). We'll plan to ramp this up. Super morbid obesity persists (BMI>50) and we'll see how sensitive her weight is to non surgical tools this year before considering in more detail our surgical program.  She's not currently interested in surgical weight loss options.       We discussed a toolbox approach to weight management today and she is open to combining mindful, reduced calorie dietary therapy with increased mindfulness techniques, activity/exercise improvements to optimize their current and future health.  Medication assistance for appetite control was discussed today and on review of the risks/benefits for this patients health history, we've decided to start with appetite suppressant therapy.    ASSESSMENT AND PLAN  Problem List Items Addressed This Visit          Digestive    Morbid obesity (H)            60 minutes spent by me on the date of the encounter doing chart review, history and exam, documentation and further activities per the note        She has the following co-morbidities:        10/13/2023     5:38 PM   --   I have the following health issues associated with obesity Sleep Apnea   I have the following symptoms associated with obesity Depression    Fatigue   CPAP use is regular.  Sept of 2020, started taking lexapro/wellbutrin and had some benefit on appetite, started losing some weight from that, about 25 lbs in her estimation (didn't have a scale that went up to 400 lbs).  After new job change, went to new MD, started phentermine and topiramate in April of 2021. Does get some fingertip paresthesias with topamax at current doses but otherwise tolerated. Came off meds in March due to  "prescription lapse and had been at plateau of effect, weight gain of 40 lbs.  Ruth couldn't get        No data to display                    10/13/2023     5:38 PM   Referring Provider   Please name the provider who referred you to Medical Weight Management  If you do not know, please answer \"I Don't Know\" Ivanna           10/13/2023     5:38 PM   Weight History   How concerned are you about your weight? Very Concerned   I became overweight As a Teenager   The following factors have contributed to my weight gain Mental Health Issues    Eating Too Much    Lack of Exercise    Genetic (Runs in the Family)    Stress   I have tried the following methods to lose weight Watching Portions or Calories    Exercise    Atkins-type Diet (Low Carb/High Protein)    Medications    Fasting   My lowest weight since age 16 was 220   My highest weight since age 16 was 425   The most weight I have ever lost was (lbs) 125   I have the following family history of obesity/being overweight My mother is overweight    My father is overweight    One or more of my siblings are overweight    Many of my relatives are overweight   How has your weight changed over the last year? Gained   How many pounds? 40   Genetic and familial risks exist for obesity.  She's done well to be keeping over 60 lbs off from her lifetime heaviest weight, about a 12% reduction in total body weight.        10/13/2023     5:38 PM   Diet Recall Review with Patient   If you do eat supper, what types of food do you typically eat? Chicken or Pork, Vegetable Mix of peppers, onions, green beans in a Japanese lopez sauce, with steamed white rice   If you do snack, what types of food do you typically eat? Two string cheese, two mandarin oranges, 15 pepperonis, handful of banana chips.   How many glasses of juice do you drink in a typical day? 0   How many of glasses of milk do you drink in a typical day? 0   How many 8oz glasses of sugar containing drinks such as Marquez-Aid/sweet tea do " you drink in a day? 0   How many cans/bottles of sugar pop/soda/tea/sports drinks do you drink in a day? 0   How many cans/bottles of diet pop/soda/tea or sports drink do you drink in a day? 0   How often do you have a drink of alcohol? Monthly or Less   If you do drink, how many drinks might you have in a day? 1 or 2   In her opinion, portion sizes at meals at night were a previous issues. Between meal intake she estimates is nonproblematic.  She's been following a diet for 4-5 months again this summer after a good year or two of weight reduction prior to coming off her phentermine.             10/13/2023     5:38 PM   Eating Habits   Generally, my meals include foods like these bread, pasta, rice, potatoes, corn, crackers, sweet dessert, pop, or juice Everyday   Generally, my meals include foods like these fried meats, brats, burgers, french fries, pizza, cheese, chips, or ice cream Once a Week   Eat fast food (like [employer], [employer]resh, Taco Bell) Once a Week   Eat at a buffet or sit-down restaurant Never   Eat most of my meals in front of the TV or computer Everyday   Often skip meals, eat at random times, have no regular eating times Everyday   Rarely sit down for a meal but snack or graze throughout A Few Times a Week   Eat extra snacks between meals Never   Eat most of my food at the end of the day Never   Eat in the middle of the night or wake up at night to eat Never   Eat extra snacks to prevent or correct low blood sugar Never   Eat to prevent acid reflux or stomach pain Never   Worry about not having enough food to eat Never   I eat when I am depressed A Few Times a Week   I eat when I am stressed A Few Times a Week   I eat when I am bored A Few Times a Week   I eat when I am anxious A Few Times a Week   I eat when I am happy or as a reward A Few Times a Week   I feel hungry all the time even if I just have eaten Everyday   Feeling full is important to me A Few Times a Week   I finish all the food on  my plate even if I am already full Never   I can't resist eating delicious food or walk past the good food/smell Less Than Weekly   I eat/snack without noticing that I am eating A Few Times a Week   I eat when I am preparing the meal Less Than Weekly   I eat more than usual when I see others eating Never   I have trouble not eating sweets, ice cream, cookies, or chips if they are around the house Almost Everyday   I think about food all day A Few Times a Week   What foods, if any, do you crave? Sweets/Candy/Chocolate   Please list any other foods you crave? Peanut Butter   Satiety signals may be impaired. On the go and distracted eating can be problematic for convenience/comfort based meals that can be highly palatable.        10/13/2023     5:38 PM   Amount of Food   I feel out of control when eating Never   I eat a large amount of food, like a loaf of bread, a box of cookies, a pint/quart of ice cream, all at once Never   I eat a large amount of food even when I am not hungry Never   I eat rapidly Never   I eat alone because I feel embarrassed and do not want others to see how much I have eaten Weekly   I eat until I am uncomfortably full Never   I feel bad, disgusted, or guilty after I overeat Weekly   Interest in evaluation for eating disorder?        10/13/2023     5:38 PM   Activity/Exercise History   How much of a typical 12 hour day do you spend sitting? Half the Day   How much of a typical 12 hour day do you spend lying down? Less Than Half the Day   How much of a typical day do you spend walking/standing? Less Than Half the Day   How many hours (not including work) do you spend on the TV/Video Games/Computer/Tablet/Phone? 4-5 Hours   How many times a week are you active for the purpose of exercise? 2-3 Times a Week   What keeps you from being more active? Too tired   How many total minutes do you spend doing some activity for the purpose of exercising when you exercise? 15-30 Minutes   Exercise and active  hobbies include: has a love of soccer and goes to [employer]. .  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  Currently working in ware house. Over 8 hour day, uses standing desk. 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   PAST MEDICAL HISTORY:  Past Medical History:   Diagnosis Date     Depressive disorder Teenage years    Currently taking Lexapro for depression and anxiety     Mild major depression (H24) 04/21/2021     Morbid obesity (H) 04/21/2021     FABIAN (obstructive sleep apnea) 01/12/2022           10/13/2023     5:38 PM   Work/Social History Reviewed With Patient   My employment status is Full-Time   My job is E-commerce   How much of your job is spent on the computer or phone? 75%   How many hours do you spend commuting to work daily? 0.25   What is your marital status? Single   Who do you live with? Mother and Brother   Who does the food shopping? Online ordering           10/13/2023     5:38 PM   Mental Health History Reviewed With Patient   Have you ever been physically or sexually abused? No   How often in the past 2 weeks have you felt little interest or pleasure in doing things? For Several Days   Over the past 2 weeks how often have you felt down, depressed, or hopeless? For Several Days           10/13/2023     5:38 PM   Sleep History Reviewed With Patient   How many hours do you sleep at night? 6   Uses CPAP    Past Surgical History:   Procedure Laterality Date     SURGICAL HISTORY OF -       None     [facility] SLEEP STUDY, UNATTENDED, RECORD HEART RATE/O2 SAT/RESP ANAL/SLEEP TM  9/29/2021            Social History     Socioeconomic History     Marital status: Single     Spouse name: Not on file     Number of children: Not on file     Years of education: Not on file     Highest education level: Not on file   Occupational History  "    Not on file   Tobacco Use     Smoking status: Never     Passive exposure: Never     Smokeless tobacco: Never   Vaping Use     Vaping Use: Never used   Substance and Sexual Activity     Alcohol use: No     Drug use: No     Sexual activity: Never   Other Topics Concern     Parent/sibling w/ CABG, MI or angioplasty before 65F 55M? Yes   Social History Narrative     Not on file     Social Determinants of Health     Financial Resource Strain: Not on file   Food Insecurity: Not on file   Transportation Needs: Not on file   Physical Activity: Not on file   Stress: Not on file   Social Connections: Not on file   Interpersonal Safety: Not on file   Housing Stability: Not on file       MEDICATIONS:   Current Outpatient Medications   Medication Sig Dispense Refill     buPROPion (WELLBUTRIN XL) 150 MG 24 hr tablet Take 1 tablet (150 mg) by mouth every morning 90 tablet 3     escitalopram (LEXAPRO) 20 MG tablet Take 1.5 tablets (30 mg) by mouth daily 135 tablet 1     phentermine (ADIPEX-P) 37.5 MG tablet Take 1 tablet (37.5 mg) by mouth every morning (before breakfast) 30 tablet 0     topiramate (TOPAMAX) 50 MG tablet Take 2 tablets (100 mg) by mouth 2 times daily 120 tablet 0     ORDER FROM ORDER PANEL Cpap         ALLERGIES:   No Known Allergies        PHYSICAL EXAM:  Objective   /82   Ht 1.702 m (5' 7\")   Wt (!) 151.7 kg (334 lb 8 oz)   LMP 08/20/2023 (Exact Date)   BMI 52.39 kg/m    /82   Ht 1.702 m (5' 7\")   Wt (!) 151.7 kg (334 lb 8 oz)   LMP 08/20/2023 (Exact Date)   BMI 52.39 kg/m    Body mass index is 52.39 kg/m .  Physical Exam   GENERAL: healthy, alert and no distress  NECK: no adenopathy, no asymmetry, masses, or scars and thyroid normal to palpation  RESP: lungs clear to auscultation - no rales, rhonchi or wheezes  CV: regular rate and rhythm, normal S1 S2, no S3 or S4, no murmur, click or rub, no peripheral edema and peripheral pulses strong  ABDOMEN: soft, nontender, no hepatosplenomegaly, " no masses and bowel sounds normal  MS: no gross musculoskeletal defects noted, no edema        Sincerely,    John Orellana MD              Again, thank you for allowing me to participate in the care of your patient.        Sincerely,        John Orellana MD

## 2023-10-16 NOTE — LETTER
"    10/16/2023         RE: Jeannie Russo  [address] [facility] [address]        Dear Colleague,    Thank you for referring your patient, Jeannie Russo, to the [facility] BARIATRICS [facility] [place]. Please see a copy of my visit note below.    Jeannie Russo is a 33 year old who is being evaluated via a billable video visit.      How would you like to obtain your AVS? MyChart  If the video visit is dropped, the invitation should be resent by: Send to e-mail at: [email or web address]  Will anyone else be joining your video visit? No          Medical Weight Loss Initial Diet Evaluation  Assessment:  This patient was referred by Dr. Orellana for MNT as treatment for Obesity.     Jeannie is presenting today for a new weight management nutrition consultation. Pt has had an initial appointment with Dr. Orellana.    Weight loss medication: Phentermine. Topamax, Naltrexone, Bupropion     Anthropometrics:    Pt's weight is 334.5 lbs   Initial weight: 334.5 lbs   Weight change: 0  BMI: There is no height or weight on file to calculate BMI.   Ideal body weight: 61.6 kg (135 lb 12.9 oz)  Adjusted ideal body weight: 97.7 kg (215 lb 4.5 oz)    Estimated RMR ([place]-St Jeor equation):  2248 kcals x 1.3 (light active) = 2922 kcals (for weight maintenance)    Recommended Protein Intake: 60-80 grams of protein/day    Medical History:  Patient Active Problem List   Diagnosis     Morbid obesity (H)     Mild major depression (H24)     FABIAN (obstructive sleep apnea)      Diabetes: No  HbA1c:  No results found for: \"HGBA1C\"    Nutrition History:   Food allergies/intolerances/cultural or religous food customs: Yes Dairy, Wheat-GI Issues, Aspartame-GI Issues    Vitamins/Mineral Supplementation: Vitamin D    Dietary Recall:  Breakfast: coffee with non dairy creamer, something quick-cheese stick   Lunch: skipped   Dinner: chicken thighs with vegetables lopez and white rice or some sort of Asian Food or crock " pot meal over rice   Typical Snacks: tangerine or pepperoni slices or banana chips or PB cookie or PB cup   Overnight eating: No  Eating out: 1 time/week     Beverages: water, coffee in the AM     Exercise: move a lot throughout the day at work, does have a standing desk    Nutrition Diagnosis (PES statement):     Obesity related to excessive energy intake as evidence by subjective statements and BMI of 52.4 kg/m2.        Nutrition Intervention  Food and/or Nutrient Delivery   Placed emphasis on importance of developing a healthy meal routine, aiming for 3 meals a day and no snacks.    Nutrition Education   Discussed with patient how to build a meal: the importance of including a lean/low fat protein at each meal, include a source of vegetables at a minimum of lunch and dinner and limiting carbohydrate intake to <25% per meal.  Educated on sources of lean protein, portion sizes, the amount of grams found in each source. Recommend patient to aim for 20-30g protein at each meal.  Educated on how to read a food label: keeping total fat <10g and sugar <10g per serving.  Discussed the importance of adequate hydration, with emphasis on drinking 64oz of water or zero calorie beverages per day.    Nutrition Counseling   Encouraged importance of developing routine exercise for health benefits and weight loss.  Goals established by patient:   Start tracking intake via food journal andrew, aiming for 3891-6686 kcals/day.   Focus on protein first at each meal, aiming for 25-30 grams of protein/meal, 3 meals/day.    Handouts provided:  Non Surgical Weight Loss packet    Assessment/Plan:    Pt will follow up in 2 month(s) with bariatrician and 1 month(s) with dietitian.       Video-Visit Details    Type of service:  Video Visit    Video Start Time (time video started): 10:24 am     Video End Time (time video stopped): 10:51 am     Originating Location (pt. Location): Home      Distant Location (provider location):  Off-site    Mode  of Communication:  Video Conference via [facility]    Physician has received verbal consent for a Video Visit from the patient? Yes      Savanah Martinez RD        Again, thank you for allowing me to participate in the care of your patient.        Sincerely,        Savanah Martinez RD

## 2023-10-17 RX ORDER — MAGNESIUM 200 MG
TABLET ORAL
Qty: 90 TABLET | Refills: 3 | Status: SHIPPED | OUTPATIENT
Start: 2023-10-17

## 2023-11-05 ENCOUNTER — MYC REFILL (OUTPATIENT)
Dept: FAMILY MEDICINE | Facility: CLINIC | Age: 33
End: 2023-11-05
Payer: COMMERCIAL

## 2023-11-05 DIAGNOSIS — E66.01 MORBID OBESITY (H): ICD-10-CM

## 2023-11-06 RX ORDER — TOPIRAMATE 50 MG/1
100 TABLET, FILM COATED ORAL 2 TIMES DAILY
Qty: 360 TABLET | Refills: 0 | Status: SHIPPED | OUTPATIENT
Start: 2023-11-06

## 2023-11-06 RX ORDER — PHENTERMINE HYDROCHLORIDE 37.5 MG/1
37.5 TABLET ORAL
Qty: 90 TABLET | Refills: 0 | Status: SHIPPED | OUTPATIENT
Start: 2023-11-06

## 2023-11-14 ENCOUNTER — VIRTUAL VISIT (OUTPATIENT)
Dept: SURGERY | Facility: CLINIC | Age: 33
End: 2023-11-14
Payer: COMMERCIAL

## 2023-11-14 DIAGNOSIS — Z71.3 NUTRITIONAL COUNSELING: ICD-10-CM

## 2023-11-14 DIAGNOSIS — E66.01 CLASS 3 SEVERE OBESITY DUE TO EXCESS CALORIES WITHOUT SERIOUS COMORBIDITY WITH BODY MASS INDEX (BMI) OF 50.0 TO 59.9 IN ADULT (H): Primary | ICD-10-CM

## 2023-11-14 DIAGNOSIS — E66.813 CLASS 3 SEVERE OBESITY DUE TO EXCESS CALORIES WITHOUT SERIOUS COMORBIDITY WITH BODY MASS INDEX (BMI) OF 50.0 TO 59.9 IN ADULT (H): Primary | ICD-10-CM

## 2023-11-14 PROCEDURE — 97803 MED NUTRITION INDIV SUBSEQ: CPT | Mod: VID | Performed by: DIETITIAN, REGISTERED

## 2023-11-14 NOTE — PROGRESS NOTES
Jenanie Russo is a 33 year old who is being evaluated via a billable video visit.    How would you like to obtain your AVS? MyChart  If the video visit is dropped, the invitation should be resent by: Send to e-mail at: [email or web address]  Will anyone else be joining your video visit? No        Medical  Weight Loss Follow-Up Diet Evaluation  Assessment:  Jeannie is presenting today for a follow up weight management nutrition consultation.  This patient has had an initial appointment and was referred by Dr. Orellana for MNT as treatment for Obesity.     Weight loss medication: Naltrexone. Bupropion, Phentermine, Topamax  Pt's weight is 322.9 lbs   Initial weight: 334.5 lbs   Weight change: 11.6 lbs (down)         No data to display              BMI: There is no height or weight on file to calculate BMI.  Ideal body weight: 61.6 kg (135 lb 12.9 oz)  Adjusted ideal body weight: 97.7 kg (215 lb 4.5 oz)    Estimated RMR ([place]-St Jeor equation):   2195 kcals x 1.3 (light active) = 2854 kcals (for weight maintenance)     Recommended Protein Intake: 60-80 grams of protein/day  Patient Active Problem List:  Patient Active Problem List   Diagnosis    Morbid obesity (H)    Mild major depression (H24)    FABIAN (obstructive sleep apnea)       Progress on goals from last visit: Has been taking 2000 international unit(s)/day of Vitamin D along with Vitamin B12 and Calcium 1 time/day. Has been tracking intake via food journal andrew, noting she hasn't been as consistent with.  Patient reports that she has been working on increased protein consumption throughout the day, which has lead to less snacking, especially processed foods.     Dietary Recall:  Breakfast: Kind Bar (higher protein one), cheese stick  Lunch:cheese and deli meat, crackers, fruit or vegetables  Dinner:usually what her mom prepares (protein with veggies and a carb)  Typical snacks: mandarin oranges (Cuties) or cheese and crackers (3 of each)    Beverages:  water-96 oz/day +, coffee with in the AM 1 cup/day     Nutrition Diagnosis:    Obesity (NC 3.3) related to overeating and poor lifestyle habits as evidenced by patient's subjective statements and BMI 50.7 kg/m2.       Intervention:  Food and/or nutrient delivery: balanced meals, adequate protein   Nutrition education: none   Nutrition counseling: provided support and encouragement    Monitoring/Evaluation:    Goals:  Continue to focus on increased protein throughout the day, aiming for at least 80 grams/day.  Work on plate method for portion control, especially pertaining to supper meals.     Patient to follow up in 1 month(s) with bariatrician and 2 month(s) with RD        Video-Visit Details    Type of service:  Video Visit    Video Start Time (time video started): 8:20 am     Video End Time (time video stopped): 8:41 am     Originating Location (pt. Location): Home      Distant Location (provider location):  Off-site    Mode of Communication:  Video Conference via [facility]    Physician has received verbal consent for a Video Visit from the patient? Yes      Savanah Martinez RD

## 2023-11-14 NOTE — LETTER
11/14/2023         RE: Jeannie Russo  [address] [facility] [id number]        Dear Colleague,    Thank you for referring your patient, Jeannie Russo, to the [facility]. Please see a copy of my visit note below.    Jeannie Russo is a 33 year old who is being evaluated via a billable video visit.    How would you like to obtain your AVS? MyChart  If the video visit is dropped, the invitation should be resent by: Send to e-mail at: [email or web address]  Will anyone else be joining your video visit? No        Medical  Weight Loss Follow-Up Diet Evaluation  Assessment:  Jeannie is presenting today for a follow up weight management nutrition consultation.  This patient has had an initial appointment and was referred by Dr. Orellana for MNT as treatment for Obesity.     Weight loss medication: Naltrexone. Bupropion, Phentermine, Topamax  Pt's weight is 322.9 lbs   Initial weight: 334.5 lbs   Weight change: 11.6 lbs (down)         No data to display              BMI: There is no height or weight on file to calculate BMI.  Ideal body weight: 61.6 kg (135 lb 12.9 oz)  Adjusted ideal body weight: 97.7 kg (215 lb 4.5 oz)    Estimated RMR (Reagan-St Jeor equation):   2195 kcals x 1.3 (light active) = 2854 kcals (for weight maintenance)     Recommended Protein Intake: 60-80 grams of protein/day  Patient Active Problem List:  Patient Active Problem List   Diagnosis     Morbid obesity (H)     Mild major depression (H24)     FABIAN (obstructive sleep apnea)       Progress on goals from last visit: Has been taking 2000 international unit(s)/day of Vitamin D along with Vitamin B12 and Calcium 1 time/day. Has been tracking intake via food journal andrew, noting she hasn't been as consistent with.  Patient reports that she has been working on increased protein consumption throughout the day, which has lead to less snacking, especially processed foods.     Dietary  Recall:  Breakfast: Kind Bar (higher protein one), cheese stick  Lunch:cheese and deli meat, crackers, fruit or vegetables  Dinner:usually what her mom prepares (protein with veggies and a carb)  Typical snacks: mandarin oranges (Cuties) or cheese and crackers (3 of each)    Beverages: water-96 oz/day +, coffee with in the AM 1 cup/day     Nutrition Diagnosis:    Obesity (NC 3.3) related to overeating and poor lifestyle habits as evidenced by patient's subjective statements and BMI 50.7 kg/m2.       Intervention:  Food and/or nutrient delivery: balanced meals, adequate protein   Nutrition education: none   Nutrition counseling: provided support and encouragement    Monitoring/Evaluation:    Goals:  Continue to focus on increased protein throughout the day, aiming for at least 80 grams/day.  Work on plate method for portion control, especially pertaining to supper meals.     Patient to follow up in 1 month(s) with bariatrician and 2 month(s) with RD        Video-Visit Details    Type of service:  Video Visit    Video Start Time (time video started): 8:20 am     Video End Time (time video stopped): 8:41 am     Originating Location (pt. Location): Home      Distant Location (provider location):  Off-site    Mode of Communication:  Video Conference via [facility]    Physician has received verbal consent for a Video Visit from the patient? Yes      Savanah Martinez RD          Again, thank you for allowing me to participate in the care of your patient.        Sincerely,        Savanah Martinez RD

## 2023-12-14 ENCOUNTER — VIRTUAL VISIT (OUTPATIENT)
Dept: SURGERY | Facility: CLINIC | Age: 33
End: 2023-12-14
Payer: COMMERCIAL

## 2023-12-14 VITALS — HEIGHT: 68 IN | BODY MASS INDEX: 44.41 KG/M2 | WEIGHT: 293 LBS

## 2023-12-14 DIAGNOSIS — E66.813 CLASS 3 SEVERE OBESITY DUE TO EXCESS CALORIES WITH SERIOUS COMORBIDITY IN ADULT, UNSPECIFIED BMI (H): Primary | ICD-10-CM

## 2023-12-14 DIAGNOSIS — E53.8 VITAMIN B12 DEFICIENCY (NON ANEMIC): ICD-10-CM

## 2023-12-14 DIAGNOSIS — E66.01 CLASS 3 SEVERE OBESITY DUE TO EXCESS CALORIES WITH SERIOUS COMORBIDITY IN ADULT, UNSPECIFIED BMI (H): Primary | ICD-10-CM

## 2023-12-14 PROCEDURE — 99214 OFFICE O/P EST MOD 30 MIN: CPT | Mod: VID | Performed by: EMERGENCY MEDICINE

## 2023-12-14 ASSESSMENT — PAIN SCALES - GENERAL: PAINLEVEL: NO PAIN (0)

## 2023-12-14 NOTE — NURSING NOTE
Is the patient currently in the state of [place]? YES    Visit mode:VIDEO    If the visit is dropped, the patient can be reconnected by: VIDEO VISIT: Text to cell phone:   Telephone Information:   Mobile [phone]       Will anyone else be joining the visit? NO  (If patient encounters technical issues they should call [phone]    How would you like to obtain your AVS? MyChart    Are changes needed to the allergy or medication list? No, Pt stated no changes to allergies, and Pt stated no med changes    Reason for visit: RECHECK (FRANCIA)    Serene WALTON

## 2023-12-14 NOTE — PROGRESS NOTES
"Virtual Visit Details    Type of service:  Video Visit   Video Start Time: 11:01 AM  Video End Time:Bariatric Clinic Follow-Up Visit:    Jeannie Russo is a 33 year old  female with Body mass index is 48.63 kg/m .  presenting here today for follow-up on non-surgical efforts for weight loss. Original Intake visit occurred on 10/16/23 with a weight of 334 lbs and BMI of 52.4 w/ comorbid FABIAN  Along with diet and behavior changes, she has been using phentermine and topamax prior to our intake visit and added naltrexone to her regimen to combine with her pre-existing bupropion and to assist her weight loss goals.  She did not tolerated the naltrexone and discontinued the morning dose, staying on evening dose only due to feeling a bit \"out of it\" if using morning dose.  See her intake visit notes for details on identified contributors to weight gain in the past. Chart review shows Dietician calculated RMR of 2195kcal/day and protein intake goal of 60-80g/day..    Low B12 on intake labs at 164pg/mL and supplementation recommended/prescribed. Previous vitamin D deficiency should be supplemented through the winter months regularly.  Weight:   Wt Readings from Last 5 Encounters:   12/14/23 145.1 kg (319 lb 12.8 oz)   10/16/23 (!) 151.7 kg (334 lb 8 oz)   09/11/23 (!) 153.8 kg (339 lb)   06/16/23 149.5 kg (329 lb 9.6 oz)   03/29/23 145.6 kg (321 lb)    pounds      Comorbidities:  Patient Active Problem List   Diagnosis    Morbid obesity (H)    Mild major depression (H24)    FABIAN (obstructive sleep apnea)       Current Outpatient Medications:     buPROPion (WELLBUTRIN XL) 150 MG 24 hr tablet, Take 1 tablet (150 mg) by mouth every morning, Disp: 90 tablet, Rfl: 3    Cyanocobalamin (B-12) 1000 MCG SUBL, Take once daily for 2 weeks then may reduce to 3 days weekly., Disp: 90 tablet, Rfl: 3    escitalopram (LEXAPRO) 20 MG tablet, Take 1.5 tablets (30 mg) by mouth daily, Disp: 135 tablet, Rfl: 1    naltrexone (DEPADE/REVIA) 50 MG " "tablet, Start half a tablet daily with supper for 7 days then increase, if tolerated to half a tablet with breakfast and supper., Disp: 90 tablet, Rfl: 0    ORDER FROM ORDER PANEL, Cpap, Disp: , Rfl:     phentermine (ADIPEX-P) 37.5 MG tablet, Take 1 tablet (37.5 mg) by mouth every morning (before breakfast), Disp: 90 tablet, Rfl: 0    topiramate (TOPAMAX) 50 MG tablet, Take 2 tablets (100 mg) by mouth 2 times daily, Disp: 360 tablet, Rfl: 0      Interim: Since our last visit, she has seen the dietician twice. Weight is down 15 lbs.  She had raise at work and no longer qualifies for state insurance. Now has high deductible plan and will be looking for new job/coverage.    Using Spokeable andrew to track intake. Finds it helpful and has free option. Has scanning option and water consumption feature well. Some sporadic use, tracking \"not enjoyed\". Has raised her mindfulness more, trying to eat better in the AM. Will add in protein w/ cheese stick/protein bar of some kind.   Lives w/ brother and mother. Brother likes to smoke meats and does a lot of protein cooking. Family supportive of diet changes.   Plan:   1.  Diet: aiming for 1925-2100kcal/day with 80 grams of lean protein daily should continue good progression in weight reduction this winter. Bare minimum intake of 1750 but I think that may be a bit too much restriction if any sort of regular activity is going on in your life.  2. Exercise: working up to 2-3 days weekly of something fun that gets you moving or using your muscles in a painfree way. Long term goal of building up to 150minutes or more weekly.  3. Medication: If unaffordable, don't refill naltrexone, continue on phentermine if tolerated well/affordable.   [facility] pharmacy or [employer] websites/coupons can improve generic drug savings for mental health medications.   4. In the new year, it sounds like insurance will be catastrophic coverage and not cover your office visits anymore. If financially " "burdensome, follow up when new coverage makes visits tolerable/affordable.   5. Goals: down 15 lbs thus far, well done aiming this winter to get weight under 300 lbs and long term aiming to get BMI under 40 (currently at 48.6).   7. Continue tracking a few days every few weeks to make sure you're on track and seeing results.       We discussed [facility] including:  -eating 3 meals daily  -reviewed metabolic needs for weight loss based on Resting Metabolic Rate  -protein goals supportive of healthy weight loss  -avoiding/limiting calorie containing beverages  -We discussed the importance of restorative sleep and stress management in maintaining a healthy weight.  -We discussed the National Weight Control Registry healthy weight maintenance strategies and ways to optimize metabolism.  -We discussed the importance of physical activity including cardiovascular and strength training in maintaining a healthier weight and explored viable options.      Most recent labs:  Lab Results   Component Value Date    WBC 9.7 04/21/2021    HGB 13.0 04/21/2021    HCT 40.3 04/21/2021    MCV 88 04/21/2021     04/21/2021     Lab Results   Component Value Date    CHOL 146 04/21/2021     Lab Results   Component Value Date    HDL 39 (L) 04/21/2021     No components found for: \"LDLCALC\"  Lab Results   Component Value Date    TRIG 94 04/21/2021     No results found for: \"CHOLHDL\"  Lab Results   Component Value Date    ALT 5 10/16/2023    AST 13 10/16/2023    ALKPHOS 73 10/16/2023     No results found for: \"HGBA1C\"  Lab Results   Component Value Date    B12 164 (L) 10/16/2023     No components found for: \"VITDT1\"  No results found for: \"YASMEEN\"  Lab Results   Component Value Date    PTHI 66 (H) 10/16/2023     No results found for: \"ZN\"  No results found for: \"VIB1WB\"  Lab Results   Component Value Date    TSH 1.19 10/16/2023     No results found for: \"TEST\"    DIETARY HISTORY  Tracking technique: see above  Positive " "Changes Since Last Visit: trying to eat more earlier in the day which is hard for her  Struggling With: stress over losing current insurance.    Getting Adequate Protein: improved  Sleep schedule: n/a.      PHYSICAL ACTIVITY PATTERNS:  Cardiovascular: steps at work.  Strength Training: limited    REVIEW OF SYSTEMS  Tolerates evening dose of naltrexone..  PHYSICAL EXAM:  Vitals: Ht 1.727 m (5' 8\")   Wt 145.1 kg (319 lb 12.8 oz)   BMI 48.63 kg/m    Weight:   Wt Readings from Last 3 Encounters:   12/14/23 145.1 kg (319 lb 12.8 oz)   10/16/23 (!) 151.7 kg (334 lb 8 oz)   09/11/23 (!) 153.8 kg (339 lb)         GEN: Pleasant, well groomed, in no acute distress  HEENT:  receding hairline .  NECK: No swelling.  HEART: .  LUNGS: No respiratory difficulty noted. No cough. .  ABDOMEN: .  EXTREMITIES: No tremor. Ambulation is independent on video, outside currently...  NEURO: Alert and Oriented X3, fluent speech. .  SKIN: No visible rashes. .    Interim study results: see above.      33 minutes spent by me on the date of the encounter doing chart review, history and exam, documentation and further activities per the note   John Orellana MD  [facility]  11:01 AM  12/14/2023    Originating Location (pt. Location): Home    Distant Location (provider location):  On-site  Platform used for Video Visit: Vannessa  "

## 2023-12-14 NOTE — PATIENT INSTRUCTIONS
Plan:   1.  Diet: aiming for 1925-2100kcal/day with 80 grams of lean protein daily should continue good progression in weight reduction this winter. Bare minimum intake of 1750 but I think that may be a bit too much restriction if any sort of regular activity is going on in your life.  2. Exercise: working up to 2-3 days weekly of something fun that gets you moving or using your muscles in a painfree way. Long term goal of building up to 150minutes or more weekly.  3. Medication: If unaffordable, don't refill naltrexone, continue on phentermine if tolerated well/affordable.   [facility] or [employer] websites/coupons can improve generic drug savings for mental health medications.   4. In the new year, it sounds like insurance will be catastrophic coverage and not cover your office visits anymore. If financially burdensome, follow up when new coverage makes visits tolerable/affordable.   5. Goals: down 15 lbs thus far, well done aiming this winter to get weight under 300 lbs and long term aiming to get BMI under 40 (currently at 48.6).   7. Continue tracking a few days every few weeks to make sure you're on track and seeing results.   8. Continue 1000mcg of sublingual B12 1-2x weekly to prevent deficiency. Vitamin D3 2000-5000IU/day this winter.      LEAN PROTEIN SOURCES  Getting 20-30 grams of protein, 3 meals daily, is appropriate for most people, some need more but more than about 40 grams per meal is not useful.  General rule is drinking one ounce of water per gram of protein eaten over the course of the day:  70 grams of protein each day, drink 70 oz of water.  Protein Source Portion Calories Grams of Protein                           Nonfat, plain Greek yogurt    (10 grams sugar or less) 3/4 cup (6 oz)  12-17   Light Yogurt (10 grams sugar or less) 3/4 cup (6 oz)  6-8   Protein Shake 1 shake 110-180 15-30   Skim/1% Milk or lactose-free milk 1 cup ( 8 oz)  8   Plain or light,  flavored soymilk 1 cup  7-8   Plain or light, hemp milk 1 cup 110 6   Fat Free or 1% Cottage Cheese 1/2 cup 90 15   Part skim ricotta cheese 1/2 cup 100 14   Part skim or reduced fat cheese slices 1 ounce 65-80 8     Mozzarella String Cheese 1 80 8   Canned tuna, chicken, crab or salmon  (canned in water)  1/2 cup 100 15-20   White fish (broiled, grilled, baked) 3 ounces 100 21   [place]/Tuna (broiled, grilled, baked) 3 ounces 150-180 21   Shrimp, Scallops, Lobster, Crab 3 ounces 100 21   Pork loin, Pork Tenderloin 3 ounces 150 21   Boneless, skinless chicken /turkey breast                          (broiled, grilled, baked) 3 ounces 120 21   [place], [place], [place], and Venison 3 ounces 120 21   Lean cuts of red meat and pork (sirloin,   round, tenderloin, flank, ground 93%-96%) 3 ounces 170 21   Lean or Extra Lean Ground Turkey 1/2 cup 150 20   90-95% Lean [place] Burger 1 gisselle 140-180 21   Low-fat casserole with lean meat 3/4 cup 200 17   Luncheon Meats                                                        (turkey, lean ham, roast beef, chicken) 3 ounces 100 21   Egg (boiled, poached, scrambled) 1 Egg 60 7   Egg Substitute 1/2 cup 70 10   Nuts (limit to 1 serving per day)  3 Tbsp. 150 7   Nut [place] (peanut, almond)  Limit to 1 serving or less daily 1 Tbsp. 90 4   Soy Burger (varies) 1  15   Garbanzo, Black, [place] Beans 1/2 cup 110 7   Refried Beans 1/2 cup 100 7   Kidney and Lima beans 1/2 cup 110 7   Tempeh 3 oz 175 18   Vegan crumbles 1/2 cup 100 14   Tofu 1/2 cup 110 14   Chili (beans and extra lean beef or turkey) 1 cup 200 23   Lentil Stew/Soup 1 cup 150 12   Black Bean Soup 1 cup 175 12       On-the-Go Breakfast Ideas  As of 2015, the latest research shows what a huge impact eating breakfast has on losing weight and feeling your best. People lose more weight when they make breakfast their biggest meal of the day compared to Dinner, but even if you cannot go to that degree, getting a breakfast that  has at least 20 grams of protein and even a moderate amount of fat is ideal for maintaining good energy through the day and limits overeating in the evening hours.  The following are some quick and easy suggestions for at least getting something of substance into your body in the morning.  Enjoy!    Eating breakfast within 90 minutes of waking up is an important part of taking care of your body on a restricted calorie diet plan.  After sleeping for hours, your body is in need of fuel.  An ideal breakfast is a combination of protein, whole-grain carbohydrates, or fruit.  Here s why:    -Protein digests very slowly in the body, helping you feel more satisfied.  -Whole grains provide dietary fiber, which also digests slowly and helps keep your gut clean.  -Fruit is a great source of vitamins, minerals, and fiber.     Each one of these breakfast combinations has between 200-300 calories and 15-20 grams of protein.  Feel free to mix and match!    Bone Broth (chicken bone broth or beef bone broth) is a great way to boost protein content. 8oz of bone broth will typically have 9-12grams of protein for 40kcal of energy.    Protein: Choose  -1/2 cup low-fat cottage cheese  -2 hard boiled eggs , or one cooked in olive oil (low/slow heat).  -1 low fat string cheese stick  -1 Junko Tadaon natural peanut butter  -Souq.com vegetarian sausage gisselle (found in freezer section)  -1 slice lowfat cheese  -6 oz 2% or lowfat Greek yogurt, such as Fage or Oikos.    PLUS    Whole Grains:  Choose   -1 whole wheat English muffin  -1 whole wheat harry, half  -1/2 Fiber One frozen muffin, thawed  -1/2 Fiber One toaster pastry  -1 whole wheat bagel thin  -1/2 cup Kashi cereal  -1 Kashi waffle (or other whole grain high-fiber waffle)  Aim for whole grain/sprouted breads with at least 3g of fiber/slice if having bread. [place] is one such brand.    OR    Fruit: Choose  -1/3 cup blueberries  -1/2 banana (or a plantain- similar to a  banana, yet smaller)  -1/2 cup cantaloupe cubes  -1 small apple  -1 small orange  -1/2 cup strawberries  -handful raspberries/blackberries (each berry is about 1 calorie).    *Adapted from [employer], Fall 20    Ten Breakfasts Under 250 calories    Ideally, getting between 350-600 calories  (depending on starting height and weight)for breakfast is ideal for avoiding hunger later in the day, adjust/add to the following accordingly:    One- 250 calories, 8.5 g protein  1 slice whole-grain toast   1 Tbsp peanut butter    banana    Two- 250 calories, 8 g protein    cup nonfat/lowfat yogurt  1/3rd cup diced no-sugar peaches  1/3rd cup cereal (like Special K, Cheerios, or bran flakes)    Three- 250 calories, 25 g protein  1 egg scrambled with 1 oz skim milk    cup shredded cheddar    whole grain English muffin  1 oz [place] orantes  1 tsp margarine spread    Four- 225 calories, 25 g protein  1/2 cup Kashi Go-Lean cereal    cup skim milk mixed with 1 scoop Bariatric Advantage protein powder    cup no-sugar diced pears    Five- 250 calories, 20 g protein    cup oatmeal prepared with skim milk, 1 scoop protein powder, and sugar-free maple syrup    Six- 200 calories, 5 g protein  1 whole grain waffle, toasted  1 tablespoon creamy peanut or almond butter    Seven-  250 calories, 19 g protein  Breakfast sandwich: 1 slice whole grain toast, cut in half.  Add 1 scrambled egg and one slice cheddar  cheese.    Eight-  250 calories, 15 g protein  2 eggs scrambled with 1/3 cup frozen spinach (heat before adding to eggs) and 2 tablespoons low fat cream cheese.    Nine-  150 calories, 15 g protein  2/3rd cup cottage cheese    cup cantaloupe    Ten- 200 calories, 20 g protein  Fruit smoothie made with 4 oz. nonfat Greek yogurt,   cup berries, 1 scoop protein powder, and 4 oz skim milk.    Ten Lunches Under 250 Calories    Aim for lunch to be around 300-400 calories a day when trying to lose weight and get that protein in!    One-  200 calories, 11 g protein  1/3 cup tuna salad made with light king on 1 slice whole grain bread  1 small peeled apple    Two- 250 calories, 16 g protein  1/3 cup lowfat cottage cheese    cup cooked green beans    small fruit cocktail (in natural juice)    Three- 200 calories, 11 g protein    grilled cheese sandwich on whole grain bread with lowfat cheese  2/3rd cup of tomato soup    Four- 250 calories, 22 g protein  Deli wrap: 1 oz sliced turkey, 1 oz sliced ham, 1 oz sliced chicken rolled up with 1 slice low-fat cheese  1 small orange    Five- 250 calories, 28 g protein  2/3rd cup chili with 1 oz shredded cheese  4 saltine crackers    Six- 250 calories, 22 g protein  1 cup fresh spinach with 2 oz chicken, 1/3rd cup mandarin oranges, and 2 tablespoons sliced almonds with 1 tablespoon  vinaigrette dressing    Seven- 200 calories, 11 g protein  1 Tbsp sugar-free preserves and 1 Tbsp peanut butter on 1 slice whole grain toast    cup nonfat/lowfat Greek yogurt    Eight- 250 calories, 18 g protein  1 small soft-shell chicken taco with 1 oz shredded cheese, lettuce, tomato, salsa, and 1 Tbsp light sour cream    cup black beans    Nine- 225 calories, 13 g protein  2 ounces baked chicken  1/4 cup mashed potatoes    cup green beans    Ten- 200 calories, 21 g protein  Deli harry: 2 oz roast beef or other deli meat with 1 tsp Shola mayonnaise and sliced tomato, onion, and lettuce  1/3rd cup cottage cheese      Ten Dinners Under 300 calories    If you're eating a large breakfast and medium lunch, keep dinner small.  300-400 calories is ideal for most people depending on their caloric needs.    One- 300 calories, 12 g protein  1-inch thick slice of turkey meatloaf    cup baked butternut squash    Two- 200 calories, 9 g protein  Bread-less BLT: 3 slices turkey orantes, sliced tomato, wrapped in a large lettuce leaf    cup peeled fruit    Three- 275 calories, 36 g protein  3 oz roasted chicken    cup cooked broccoli    cup shredded  cheddar cheese    cup unsweetened applesauce    Four- 200 calories, 25 g protein  3 oz baked tilapia  1/3rd cup cooked carrots    cup yogurt    Five- 250 calories, 20 g protein  Grilled ham  n  Swiss: spread 2 tsp ghee or butter on 1 slice of whole grain bread.  Cut bread in half, layer 2 oz deli ham with 1 piece of Swiss cheese and grill until cheese is melted.    cup cooked vegetables    Six- 250 calories, 18 g protein  Vegetarian cheeseburger: 1 [place] cheeseburger topped with lettuce, onion, tomato, and ketchup/mustard    cup sweet potato fries    Seven- 250 calories, 18 g protein  Pork pot roast: 2 oz roasted pork loin, 1/3rd cup roasted carrots,   medium potato, cooked with   cup gravy    Eight- 330 calories, 25 g protein  2 oz meatballs (about 2 small meatballs)    cup spaghetti sauce  1/2 piece toast topped with 1 tsp ghee or butterand topped with garlic powder, toasted in oven    Nine- 250 calories, 16 g protein  Mexican pizza: one 8  corn tortilla topped with 2 oz chicken,   cup salsa, 2 tablespoons black beans, 2 tablespoons shredded cheese.  Bake until cheese is melted.    Ten- 250 calories, 22 g protein  Shrimp stir-duke: 3 oz cooked shrimp, 1/6th onion,   pepper,   cup chopped carrots sautéed in 1 tablespoon olive oil, topped with 2 tablespoons stir duke sauce and a pinch of sesame seeds        150 Calories or Less Snack Ideas   1 hardboiled egg with   cup berries  1 small apple with 1 hardboiled egg  10 almonds with   cup berries  2 clementines with 1 light string cheese  1 light string cheese with   sliced apple  1 light string cheese wrapped in 2 slices of turkey  4 100% whole wheat crackers (e.g. Triscuit) with 1 light string cheese    c. cottage cheese with   cup fruit and 1 Tbsp sunflower seeds     cup cottage cheese with   of an avocado     can tuna fish with 1 cup sliced cucumbers     cup roasted garbanzo beans with paprika and cayenne pepper    baked sweet potato with   cup chili beans or   cup  cottage cheese  2 oz. nitrate free turkey slices with 1 cup carrots  1 container (6 oz) of low sugar (less than 10 grams of sugar) greek yogurt   3 Tablespoons of hummus with 1 cup sliced bell peppers   2 Tablespoons of hummus with 15 baby carrots  4 Tablespoons ranch dip made with plain Greek Yogurt and 3 mini cucumbers  1/4 cup nuts (any kind)  1 Tablespoon peanut butter with 1 stalk celery   1 dill pickle wrapped in 1-2 slices of deli ham with 1 tsp of mayonnaise/mustard.  Example Meal Plan for a 9754-5718 Calorie Diet:    In order to fuel your weight loss properly and avoid hunger-induced overeating later in the day, for your height and weight, you will enjoy the most success by following the diet below or similar with adjustments based on your particular tastes and preferences.  Exercise may influence speed, amount of weight loss further.     I recommend getting into a meal routine and keeping it similar day to day in the beginning so you don t have to think too hard about what you re going to make/eat.  Keep snacks healthy, ideally containing protein and some vegetables.  Non-processed food is preferable to packaged items.  Eat at least a few crunchy green vegetables if having a snack, which should be 2-3 hours after your mealtimes(prepare these ahead of time for ease of use).  Drink 64 oz -80 oz of water daily for most, some of you will need more and we'll discuss it at your visit if that is the case.      When changing our diet,  we can often mistake thirst for hunger or just have some distracted eating habits that we need to break free from ('bored/mindless eating', screen time,work, driving,etc).  A glass of water and reconsideration of our hunger is often all that is needed.  Having the urge is not the problem, but watching it pass by without acting on it is the goal.    If you re having hunger problems, add a protein drink/snack to your morning hours or afternoon snack with at least 20grams of protein  and not too much sugar (under 10g).  A carton of higher protein/low sugar yogurt can work as well.  If the urge to snack is overwhelming and not satiated, try going for a 10 minute walk/exercise, come home and drink a glass of water and if still hungry, have a  calorie snack (handful of raw/sprouted nuts, veggies and string cheese, protein bar, etc).  Savor it.    It is better to have a large breakfast, a moderate lunch and a smaller dinner to fuel your day.  People lose 10-15% more weight during their weight loss season with this strategy. Optimizing your protein intake at each meal will further keep you more satisfied while eating less food overall.  Getting exercise in early has also been shown to offer the best results (before breakfast ideally but anytime is the right time to exercise if that is not an option for you).    To make sure you re getting adequate vitamins and minerals during weight loss, I recommend one complete multivitamin a day of your choice.  Consider a probiotic and taking some vitamin D 2000 IU daily.    Let supper be your last meal of the day and ideally try to have at least 12 hours between supper and breakfast the next day to tap into some beneficial overnight fasting dynamics.  Midnight snacks need to go away. Water in the evening is fine, unsweetened, non caffeinated herbal tea is helpful as well.  Consolidating your meals within a 8-12 hour period of your day will help tap into these additional metabolic benefits and tends to keep your appetite up for breakfast, further helping to stay on track.  For most of my patients, I don't recommend an intermittent fasting style diet (many find it hard to fit in their lifestyle) but an overnight fast is very doable for most patients and helps regulate our hunger drives a little better.  This makes it very important to nail good intake at all three meals to feel satisfied/energized and still lose weight.      If evening snacking desires are  high, consider a glass of fiber supplement for some additional fullness (metamucil or similar). Most of us don't get the 25-30 grams per day of fiber that promotes good gut health/satiety.  Benefiber, metamucil, citrucel are reasonable/affordable options for most people.  Inulin, chicory, psyllium husk are reasonable options but start slow and low in the dose to avoid gas/bloating until your gut gets acclimated (ramping up to 5-10 grams per day of supplemental fiber after 3-4 weeks if needed).      Example Meal Plan:  Breakfast: 450-475 Calories  1 egg cooked on low in olive oil:   calories.  5oz Greek Yogurt (Fage plain classic: ~150 manuel)  Handful of Berries of your choice (about a calorie per berry or 20-40cal per handful)    cup(cooked) of  old fashioned oatmeal or 1/2 cup(cooked) steel cut oats. (150 manuel)  Sprinkle amount of brown sugar and a pat of butter. (40 manuel)  Glass of  Water  Black coffee or unsweetened Tea (0calories).      2-3 hours Later Snack: (195 calories).  Glass of water  One string Cheese (80 calories) or 4 oz creamed cottage cheese (115 calories) with  Crunchy Celery sticks (less than 10 calories per large stalk) 2 stalks. (20 calories)    of a  Large Banana or   of a Large Apple (60 calories):  eat second half at lunch or afternoon snack.     Lunch:300 -350 calories   Chicken Breast  (baked/broiled/roasted/grilled)  4-6 oz.  (125-180 manuel), BBQ sauce/hot sauce/mustard/seasoning is free. Just use a reasonable amount. Or a can of tuna with 1 tablespoon mayonnaise.  Salad: lettuce, any other veggies (cucumbers, green peppers/celery you like and a small drizzle of dressing to just flavor.  Go as big on the veggies as you like,  as they are practically calorie free.   A whole, 8 inch cucumber is 45 calories, a whole green pepper is 23 calories, a stalk of celery is 9 calories.  [place] Dressing is 60 calories per tablespoon..so moderate your desired dressing or do a drizzle of olive  oil and splash of balsamic vinegar on top,  Total calories unlikely to be over 150 even with dressing.  Glass of Water.    Option for lunch is meal replacement protein drink/smoothie.  Need at least 20 grams of protein and eat the rest of your apple/banana from the morning snack.      Afternoon Snack: 150-200 calories   Cheese Stick or cottage cheese again  and a fresh fruit OR  Granola Bar (protein Bar acceptable if under 200 calories OR  Homemade smoothies:  8oz skim milk,  a handful of berries (fresh or frozen and a serving of protein powder such as BiPro or Moni sWhey for example.  If you don't like dairy, make with 8oz water, one small banana, handful of berries and the protein powder, add any veggies you want as well:  roughly 200 calories.   Glass of Water    Dinner: 325 calories  4oz of fresh, Atlantic salmon.  Broiled (salt/pepper/dill) for about 8-8.5 minutes (200calories) or  4oz filet mignon steak or sirloin steak  Salad or vegetable sautéed lightly in olive oil or   Broccoli 1.5  cups chopped and steamed  or micro-waved in a little water (75 calories)  Glass of Water,    Cup of herbal tea (unsweetened, caffeine free)      Herbs and seasonings are encouraged to flavor your foods/vegetables.  Make your food delicious.      Tips for Success:  1.  Prepare proteins ahead of time (broil chicken breasts in bulk so you can grab and go), steel cut oats/lentils can be stored in casserole dish/bowl in the fridge for quick scoop in the morning and rewarm in microwave, make use of crock pot recipes (watch salt content).  Making meals that cover 3-4 future meals is an easy way to stay on track.  2.  Drink a 8-12 oz glass of water every 2-3 hours when awake.  We often mistake hunger for thirst, especially when losing weight.  3. Remember your Reward and Motivation when things get hard.  4.  Weigh yourself every morning and record, you'll stay on track better and learn how our biorhythms, diet and elimination patterns  "show up on the scale. Don't worry about 1 or 2 day patterns, but when on track you'll notice good trend downward of weight over 3-4 day segments.  Plateaus tend to resolve after 4-8 days in most cases if you stay consistent with your plan.  These are natural and part of weight loss, even if you're perfect with your plan execution.  5. Call if problems/concerns.  [employer] is a great tool to stay in touch and provide weekly outside accountability. Check in with questions or if you want to brag.  6.  Find a handful of meals/foods that keep you on track and feeling good and get into a routine that is sustainable for you.  It's OK to have a routine that works for you.  7.  Consider taking a complete multivitamin just to make sure all micronutrients are adequate during weight loss.  8. If losing hair/brittle nails it usually means you are not taking enough protein.  Minimum goal is 60 grams daily of protein for smaller women, 80 grams a day for men. Consider taking Biotin as supplement or a \"Hair and Nail\" multivitamin.    Jeannie, increase portions by 20-25% to hit intake goals if using examples above.    Information about the Weight Loss Medication Phentermine    When combined with mindful eating and behavior changes, weight loss medications can be a nice additional tool to maximize your weight loss season.  There are no magic pills and without diet and behavior changes, weight loss will be minimal.  Think of this medication as a tool to make your diet and behavior changes easier and you'll enjoy a higher probability of success.  Remember not to skip meals, but use this medication to tolerate your reduced calories more easily.  If you are very hungry in the evenings, you are likely not eating enough in the first 10 hours of your day and need to focus on getting your protein requirements in at each of your 3 daily meals.      Phentermine is a stimulant medication related to the amphetamine class of medication but with a " "lower risk of dependence and addiction.  It is used for weight loss by suppressing the appetite region of the brain.  It also may speed up the metabolic rate and give a person more energy.  Like any medication there are potential side effects and the most common are:  Dry mouth occurs in almost everyone (hydrate well), fewer people experience Palpatations, fast heart rate, elevation of blood pressure, restlessness, insomnia, dizziness, change in mood, tremor, headache, changes in bowel movements,itchiness, changes in sex drive.  If you are or may have become pregnant, do not use phentermine as it increases the risk for birth defects/miscarriage.  Do not use if breastfeeding.    Some people can develop serious side effects which include:  Heart strain (\"ischemia\").  Tachycardia (fast heart rate or irregular heart rate).  Hypertension  Pulmonary Hypertension  Psychosis  Dependency and abuse has occurred in some.  If you've been on high dose (37.5mg) for long periods, phentermine should be tapered down over a few weeks before abruptly stopping as seizures have been reported rarely.    We do not recommend taking it in combination with the following medications due to potential drug interactions which can increase the risk of side effects and/or potential for seizures:    Absolutely contraindicated are:  Amphetamines or other stimulants like ADHD medication: (dextroamphetamine, amphetamine, diethylpropion, isocarboxazid, methamphetamine, lisdexamfetamine, benzphetamine,dexmethylphenidate, methylphenidate, selegiline patch, sibutramine, tranylcypromine.    Avoid use with:   Dopamine, dobutamine, ephedra, ephedrine, epinephrine, isoproterenol, linezolid, norepinephrine, phenylephrine injection, venlafaxine (Effexor).    Monitor or modify dose with:  Acebutolol, atenolol, betaxolol, bisoprolol, carvedilol, droxidopa, esmolol, labetalol, magnesium citrate, metoprolol, nadolol, nebivolol, penbutolol, pindolol, propranolol, " sotalol, timolol.    Caution with: armodafinil,betaxolol eye drops, brexpiprazole, bupropion, busulfan, caffeine, carteolol drops, enzalutaminde, ginseng, green tea, guarana, levobunolol drops, lindane cream, modafinil, afrin nasal spray (oxymetazoline), pamabrom, phenylephrine oral and nasal spray, pseudoephedrine (sudafed), rasgiline, sleegiline, bowel prep, tiagabine, timolol drops,  TRAMADOL due to increased risk of seizures.    The current cheapest place to fill your prescription is [facility] [employer], [facility] or [place] pharmacy,[facility] or [facility] and is around $22for 90 tablets.  Occasionally, [employer] and [facility] have price matched, so call around and get the best price for you.  Other pharmacies may charge closer to$70- $100 for the same prescription. You don't have to be a member to use the pharmacy at [facility] currently.  An alternative some patients have tried is using a voucher system through [employer].  $25 paid on their website gets you a  voucher that can allows you to pick your meds up at [facility] without paying anything more.  [employer] may also offer discounted coupons and give prices around you.    Dosing:  We start with half a tablet for the first 2-3 weeks and if tolerating it without problems, you can take up to one full tablet daily in the morning after breakfast.  For those with evening hunger problems, sometimes half a tablet in the morning and half a tablet around 1 pm can be effective, however, risks of nighttime insomnia/restless increase with afternoon dosing so call me at the clinic if considering this regimen or having any issues.  You only have to use the amount effective for you, not to exceed one full tablet.  It can also be used situationaly and does not have to be taken every day. For more sensitive individuals,: get a pill cutter and cut the half tab into quarters and use a quarter of a tablet, about 9mg, for a couple weeks before increasing to half a tablet (18.75mg) if  "needed.  As always, if any questions give us a call at the [facility] telephone:  [phone].     Don't use Phentermine if sick/ill or using other stimulants/cold medications and it's OK to skip days that you don't feel the need for appetite suppressant assistance.  This medication works the day you take it and doesn't require \"building up\" in the system.  Topiramate for weight loss:    Topiramate (Topamax) is used to prevent seizures and migraines. Although it's not currently FDA approved for weight loss, it has been used safely for a number of years to help people lose weight.    It seems to work on areas of the brain to quiet signals related to eating and decreases appetite and cravings.    Topiramate may make some people feel:  -less interested in eating between meals.  -think less about food/eating.  -easier to push the plate away.  -giving up soda pop easier (generally makes it taste bad).  -have more control with portions.    How to take it:    1. Start at bedtime: one tablet, 25mg, nightly for a week.  2. Increase to 2 tablets (50mg) week 2 each night.  3. Increase to 3 tablets (75mg) the 3rd week and stay on this dose until follow up if tolerated. Back off to next lower dose if not tolerating it. Discontinue if rash/mood swing/dizziness or if feeling unwell.    *Don't stop taking it if you don't think it's doing anything. It's effect can be subtle for some.    *If prolonged use for months, we recommend tapering down over several weeks to reduce the risk of withdrawal/seizures.    Do not take with sleeping pills.    Higher doses are associated with more sedation/confusion/forgetfulness so we try to limit those side effects by keeping the dose to 75mg twice daily maximum in most cases.    Potential Side Effects:  The most common side effects are:  -Tingling in the hands/feet or face.  -change in concentration.  -attention difficulty  -depression.  -increased body " temperature.  -decreased sweating.  -increase risk of kidney stones.  -feeling sleepy/sedated tends to improve after a short time of use.    How to Store Topiramate (Topamax):  -Store in closed container at room temp away from heat/moisture.  -Keep out of the reach of children and pets.      When should I call my medical weight management team?  Call right away if yo notice any of the followin. Memory/speech problems.  2. Confusion/word finding difficulty (about 10% risk).   3. Change in vision as some glaucoma sx may worsen.  4. Nausea/vomiting feeling unwell for unclear reasons.    Contact call center if questions:  [phone].    What should I know before taking this medication?    1. Topiramate works best when you help it work:   -Decrease temptations around the house.   -stay away from situations that may trigger you.  MEDICATIONS FOR WEIGHT LOSS  There are several medications available to assist us in weight loss.  By themselves, without a mindful change in diet and increase in movement/activity these medications are disappointing in their results. However, combined with a closely monitored program of diet change and exercise they can be very effective in controlling appetite and boosting initial weight loss.  All weight loss medications need continual re-evaluation for efficacy as their side effects and health benefits fail to be worthwhile if a person is not continuing to lose weight or in maintaining their healthy weight.  Some weight loss medications are scheduled drugs, meaning there is at least a theoretical possibility for developing addiction to them, but in practice this is rare.  We do anticipate coming off meds in the future- after stabilization of weight loss is assurred.  Finally, a tolerance can develop and people s perceived efficacy of medication can diminish.  In communication with your physician, it may be appropriate to intermittently take a break from these medications and then  restart again (few weeks off then restart again) if a plateau is reached that cannot be broken through.  Each person can respond to a medication differently and to be a good option for you, it will need to be affordable, effective and well tolerated with minimal side effects.    In most cases, weight loss progress after one month and three months will be obtained and if a patient is not reaching the satisfactory progress towards weight loss, the medications may be discontinued.  The thought is that if a person is taking a weight loss medication and not receiving the potential health benefit of that drug, the side effects are not worthwhile and use should be discontinued.  On the flip side, there are many people on some weight loss medications for years because it continues to be an effective tool in their weight management and they are tolerating the medication without any long-term side effects.  Each person's response and purpose will be evaluated.      PHENTERMINE (Adipex): approved in 1959 for appetite suppression.  It has stimulant effects and cannot be used with Ritalin, Concerta, or other stimulants.  Although it is not highly addictive, it's chemically related to amphetamines which are addictive and is classified as a Controlled Substance by the [place].  Occasional dependence can develop, but rarely. The most common side effects are dry mouth, increased energy and concentration, increased pulse, and constipation.  You should not take phentermine if you have glaucoma, hyperthyroidism, or uncontrolled/untreated hypertension or overly anxious. You should stop if dramatic mood swings, severe insomnia, palpations, chest pains, visual changes or if your Blood Pressure is consistently elevated or any time it's over 160/90.   It's ok to go off the med for a few weeks and restart if efficacy is wearing off.  $24-$30 for 90 tablets at [facility]. Females are required to have reliable birth control to reduce the risk  birth defects/miscarriage.      TOPIRAMATE (Topamax): Anti-seizure medication, also used to prevent migraines and sometimes for mood stabilization.  Side effects include paresthesia, glaucoma, altered concentration, attention difficulties, memory and speech problems, metabolic acidosis, depression, increase in body temperature and decrease sweating, risk of kidney stones.  Do not take Topamax while taking Depakote as this can cause high ammonia levels.  You must have reliable birth control as Topamax can cause birth defects.  If prolonged use has occurred it should be tapered off slowly to avoid withdrawal issues.  Insurance usually covers Topiramate.  At higher doses, there may be some confusion/forgetfulness associated with this so we try to limit dose to under 75mg twice daily to reduce this risk. Often covered by insurance as it's used for many reasons.  Topamax will cause carbonated beverages to taste bad. A recheck of your kidney/electrolytes may occur within a few months of starting.    QSYMIA (Phentermine + Topamax):  See above information about phentermine and Topamax.  Most common side effects are paresthesia, dizziness, distortion of taste, insomnia, constipation, and dry mouth.  See above descriptions for the two individual agents.Females are required to have reliable birth control to reduce the risk birth defects/miscarriage.  $150-$220 per month      GLP1 Agonists:  Liraglutide (Victoza/Saxenda), Semaglutide (Ozempic/Wegovy):   Part of the family of Glucagon Like Peptide Agonists, these medications directly suppresses appetite and are often used by diabetic patients due to improvements in glucose/insulin balance.  They also slow how quickly the stomach empties, increasing fullness. They may be hard to get covered for non diabetics and some plans have exclusions for weight loss purposes.  Currently, these are  injectable medications delivered via autoinjector pen. It can be very costly without insurance  "coverage (over $500/month).  Small risks for pancreatitis exists and dose should be held if increased mid abdominal pain/burning. It is not to be used if previous Multiple Endocrine Neoplasia. In rodents, may increase risk of thyroid tumors and not indicated for anyone with a history of medullary thyroid cancer as a result.  If changes in voice/swallowing should be discontinued. Reliable birth control required in women. [email or web address], Wegovy.[email or web address] has more information on these medications.    Contrave (Bupropion/Naltrexone).    Synergistic combination of a mild appetite suppressing anti-depressant (Bupropion) whose effects are increased due to interaction with Naltrexone.  Naltrexone may have some effects on craving and is often used in addiction medicine to help previous opiate addicts be less prone to relapse as it blocks the action of opiates. Should be stopped if any need for opiate pain medication, surgery or planned procedures where you'll be given sedation/anesthesia. If prolonged use, recommend stepping down bupropion over 2-3 weeks to limit any risk of withdrawal issues. Side effects may include dry mouth, increased heart rate, mild elevation in Blood pressure;  dizziness, ringing in the ears, anxiety (typically due to bupropion), nausea, constipation, and some get fatigued with naltrexone.  About $210 on [facility] for 120 tabs of \"Contrave\", the brand name without insurance coverage. Generic Bupropion 75mg: $25 for 120 tabs, Naltrexone: $55 for 90 tabs without insurance coverage on [employer]. Cannot be used if pregnant/trying to conceive or breast feeding.      Plenity:   By mail order pharmacy only, moderately expensive. $98/month.  2-3 capsules taken with 16 oz of water, 20 minutes before 2 meals daily provides crystals that expand into a gel that fills the stomach 20-25% and provides a mechanical fullness.  The Plenity then mixes with your next meal and increases satisfaction by bulking the meal up to feel " "bigger than it truly is. Plenity is not absorbed and gets passed through the digestive tract and excreted in stools. May cause some bloating/gas/full feeling as it behaves like a fiber in many ways. Cost not available yet. FDA cleared in March of 2019 and became available near the end of 2020 through mail order pharmacy only ([employer]). The safety and efficacy trials were done under \"Gelesis\" name. Not appropriate for people with stomach or bowel motility issues as requires you to pass it through digestive tract. [email or web address] has more information.  Exercise Guidance    Nearly everything that bothers us gets better when the proper amount of exercise can be done in the proper amounts.  Getting to that level safely and without injury is the key.  When it comes to weight loss, exercise is especially important in maintaining the weight loss.  Unfortunately, one of the harsh realities is that substantial weight loss slows our metabolism, often anywhere from 5-20%.    Our brain always remembers our heaviest weight and we can return to that if we're not mindful and moving regularly.  Our biology doesn't understand the concept of having too much energy, only not having enough.  As such, when we lose weight, it's thought that the brain interprets this as we're ill or in a famine and dials back our metabolism to limit further weight loss.  This is why exercise is so important in keeping the weight off and is the main reason people have some weight regain from their low weight point after weight loss.  We have to make up that 10-20% of calories not being burned.Since we can restrict our intake for only so long, exercise becomes very important in our long term healthy weigh maintenance to balance out the occasional indiscretion with our diet.    Generally, for every 5% body weight reduction in a weight loss season, a person needs to add  kilocalories of exercise in their daily routine to keep that weight off for the long " term.  This is why it's vital to be starting your fitness regimen during weight loss season, so that routine is well established as you move into your maintenance period.    Additionally, all sorts of good enzymes and genes turn on with exercise and our stress, sleep, mood and bodies feel better when we can get to the point of making ourselves a little sweaty and short of breath 35-50 minutes most days of the week. But we have to start with what we can do first and give ourselves permission to work our way up to this goal.    Who isn't ready for exercise? Well, if you get severe dizziness/palpitations, chest pain or short of breath/faint with even minimal activity like walking across a room or you're having to pause while going up a flight of stairs, then getting your heart and/or lungs fully evaluated prior to starting an exercise regimen is recommended. Everyone else can probably start a program, but everyone may start at a different point:  Some can set a 5-10 minute walking goal and others will be able to ride their bike for an hour.      Start with where you're at and look to add 10% more each week until you're at that 150 minutes or more a week (or 75 minutes/week or more of vigorous exercise). Moderate exercise can be estimated as the pace you can carry on a conversation and vigorous is the pace at which you can get 3-5 words out before having to take a breath.  If you're using heart rate monitoring, Moderate is about 60% of your maximum heart rate and vigorous about 75%. (Max heart rate estimated as 220 beats minus your age:  Example: 220-age of 42 =176 Beats per minute (BPM) maximum. 0.6X 176= 105 BPM (moderate), 132 BMP(vigorous)).    If you like to count steps, the 10,000 steps per day does correlate well with weight maintenance but try to make at least 20-25% of those steps at a brisk pace (like you are about to miss your bus).    Finally, if you are pressed for time, it's important to know that some  "exercise is better than none.  High Intensity Interval training (HIIT) is a good way to get as much out of a short period of working out. If you can't walk, use the stairs, bike or swim; you could use a punching/arm workout regimen for your activity.  The idea with HIIT is to have a 3-6 minute warm up period of low intensity and the 3-6 \"intervals\" where you push the intensity up and then recover and start the next interval. One study showed that 3 intervals of 20 seconds at \"Maximum Effort\" while either biking on a stationary bike or going up stairs and then having 100 seconds recovery time before the next Maximum Effort was equally as beneficial on cardiovascular fitness development as doing 30 minutes of moderately paced walking 3 days weekly over a 6 week period of time.  So intensity matters. You just need to be able to safely do your desired exercise without injury. There are many great HIIT exercises/routines out there. IF you're not doing much exercise currently, I recommend giving your self 2-3 weeks of moderate exercise, 3 days weekly minimum to get your bones/tendons/muscles used to exercise before going for High Intensity workouts.    If you like to use Apps on the phone, the couch to 5k andrew and 7 minute workout apps are nice places to start if you are reasonably healthy.  There are hundreds of other options out there.  Consider viewing [employer] if gentler exercise/movement is desired. Videos on John Chi and chair yoga for seniors exist and are free. Check them out and let's get that 3-4 days a week routine going.    Let's move!  John Orellana MD.     "

## 2023-12-14 NOTE — LETTER
"    12/14/2023         RE: Jeannie Russo  [address] [facility] [address]        Dear Colleague,    Thank you for referring your patient, Jeannie Russo, to the [facility] [place]. Please see a copy of my visit note below.    Virtual Visit Details    Type of service:  Video Visit   Video Start Time: 11:01 AM  Video End Time:Bariatric Clinic Follow-Up Visit:    Jeannie Russo is a 33 year old  female with Body mass index is 48.63 kg/m .  presenting here today for follow-up on non-surgical efforts for weight loss. Original Intake visit occurred on 10/16/23 with a weight of 334 lbs and BMI of 52.4 w/ comorbid FABIAN  Along with diet and behavior changes, she has been using phentermine and topamax prior to our intake visit and added naltrexone to her regimen to combine with her pre-existing bupropion and to assist her weight loss goals.  She did not tolerated the naltrexone and discontinued the morning dose, staying on evening dose only due to feeling a bit \"out of it\" if using morning dose.  See her intake visit notes for details on identified contributors to weight gain in the past. Chart review shows Dietician calculated RMR of 2195kcal/day and protein intake goal of 60-80g/day..    Low B12 on intake labs at 164pg/mL and supplementation recommended/prescribed. Previous vitamin D deficiency should be supplemented through the winter months regularly.  Weight:   Wt Readings from Last 5 Encounters:   12/14/23 145.1 kg (319 lb 12.8 oz)   10/16/23 (!) 151.7 kg (334 lb 8 oz)   09/11/23 (!) 153.8 kg (339 lb)   06/16/23 149.5 kg (329 lb 9.6 oz)   03/29/23 145.6 kg (321 lb)    pounds      Comorbidities:  Patient Active Problem List   Diagnosis     Morbid obesity (H)     Mild major depression (H24)     FABIAN (obstructive sleep apnea)       Current Outpatient Medications:      buPROPion (WELLBUTRIN XL) 150 MG 24 hr tablet, Take 1 tablet (150 mg) by mouth every morning, Disp: 90 " "tablet, Rfl: 3     Cyanocobalamin (B-12) 1000 MCG SUBL, Take once daily for 2 weeks then may reduce to 3 days weekly., Disp: 90 tablet, Rfl: 3     escitalopram (LEXAPRO) 20 MG tablet, Take 1.5 tablets (30 mg) by mouth daily, Disp: 135 tablet, Rfl: 1     naltrexone (DEPADE/REVIA) 50 MG tablet, Start half a tablet daily with supper for 7 days then increase, if tolerated to half a tablet with breakfast and supper., Disp: 90 tablet, Rfl: 0     ORDER FROM ORDER PANEL, Cpap, Disp: , Rfl:      phentermine (ADIPEX-P) 37.5 MG tablet, Take 1 tablet (37.5 mg) by mouth every morning (before breakfast), Disp: 90 tablet, Rfl: 0     topiramate (TOPAMAX) 50 MG tablet, Take 2 tablets (100 mg) by mouth 2 times daily, Disp: 360 tablet, Rfl: 0      Interim: Since our last visit, she has seen the dietician twice. Weight is down 15 lbs.  She had raise at work and no longer qualifies for state insurance. Now has high deductible plan and will be looking for new job/coverage.    Using [employer] to track intake. Finds it helpful and has free option. Has scanning option and water consumption feature well. Some sporadic use, tracking \"not enjoyed\". Has raised her mindfulness more, trying to eat better in the AM. Will add in protein w/ cheese stick/protein bar of some kind.   Lives w/ brother and mother. Brother likes to smoke meats and does a lot of protein cooking. Family supportive of diet changes.   Plan:   1.  Diet: aiming for 1925-2100kcal/day with 80 grams of lean protein daily should continue good progression in weight reduction this winter. Bare minimum intake of 1750 but I think that may be a bit too much restriction if any sort of regular activity is going on in your life.  2. Exercise: working up to 2-3 days weekly of something fun that gets you moving or using your muscles in a painfree way. Long term goal of building up to 150minutes or more weekly.  3. Medication: If unaffordable, don't refill naltrexone, continue on " "phentermine if tolerated well/affordable.   [facility] online pharmacy or [employer] websites/coupons can improve generic drug savings for mental health medications.   4. In the new year, it sounds like insurance will be catastrophic coverage and not cover your office visits anymore. If financially burdensome, follow up when new coverage makes visits tolerable/affordable.   5. Goals: down 15 lbs thus far, well done aiming this winter to get weight under 300 lbs and long term aiming to get BMI under 40 (currently at 48.6).   7. Continue tracking a few days every few weeks to make sure you're on track and seeing results.       We discussed [facility]ariat[facility] Basics including:  -eating 3 meals daily  -reviewed metabolic needs for weight loss based on Resting Metabolic Rate  -protein goals supportive of healthy weight loss  -avoiding/limiting calorie containing beverages  -We discussed the importance of restorative sleep and stress management in maintaining a healthy weight.  -We discussed the National Weight Control Registry healthy weight maintenance strategies and ways to optimize metabolism.  -We discussed the importance of physical activity including cardiovascular and strength training in maintaining a healthier weight and explored viable options.      Most recent labs:  Lab Results   Component Value Date    WBC 9.7 04/21/2021    HGB 13.0 04/21/2021    HCT 40.3 04/21/2021    MCV 88 04/21/2021     04/21/2021     Lab Results   Component Value Date    CHOL 146 04/21/2021     Lab Results   Component Value Date    HDL 39 (L) 04/21/2021     No components found for: \"LDLCALC\"  Lab Results   Component Value Date    TRIG 94 04/21/2021     No results found for: \"CHOLHDL\"  Lab Results   Component Value Date    ALT 5 10/16/2023    AST 13 10/16/2023    ALKPHOS 73 10/16/2023     No results found for: \"HGBA1C\"  Lab Results   Component Value Date    B12 164 (L) 10/16/2023     No components found for: \"VITDT1\"  No results " "found for: \"YASMEEN\"  Lab Results   Component Value Date    PTHI 66 (H) 10/16/2023     No results found for: \"ZN\"  No results found for: \"VIB1WB\"  Lab Results   Component Value Date    TSH 1.19 10/16/2023     No results found for: \"TEST\"    DIETARY HISTORY  Tracking technique: see above  Positive Changes Since Last Visit: trying to eat more earlier in the day which is hard for her  Struggling With: stress over losing current insurance.    Getting Adequate Protein: improved  Sleep schedule: n/a.      PHYSICAL ACTIVITY PATTERNS:  Cardiovascular: steps at work.  Strength Training: limited    REVIEW OF SYSTEMS  Tolerates evening dose of naltrexone..  PHYSICAL EXAM:  Vitals: Ht 1.727 m (5' 8\")   Wt 145.1 kg (319 lb 12.8 oz)   BMI 48.63 kg/m    Weight:   Wt Readings from Last 3 Encounters:   12/14/23 145.1 kg (319 lb 12.8 oz)   10/16/23 (!) 151.7 kg (334 lb 8 oz)   09/11/23 (!) 153.8 kg (339 lb)         GEN: Pleasant, well groomed, in no acute distress  HEENT:  receding hairline .  NECK: No swelling.  HEART: .  LUNGS: No respiratory difficulty noted. No cough. .  ABDOMEN: .  EXTREMITIES: No tremor. Ambulation is independent on video, outside currently...  NEURO: Alert and Oriented X3, fluent speech. .  SKIN: No visible rashes. .    Interim study results: see above.      33 minutes spent by me on the date of the encounter doing chart review, history and exam, documentation and further activities per the note   John Orellana MD  [facility]  11:01 AM  12/14/2023    Originating Location (pt. Location): Home    Distant Location (provider location):  On-site  Platform used for Video Visit: Vannessa      Again, thank you for allowing me to participate in the care of your patient.        Sincerely,        John Orellana MD  "

## 2023-12-18 ENCOUNTER — MYC MEDICAL ADVICE (OUTPATIENT)
Dept: FAMILY MEDICINE | Facility: CLINIC | Age: 33
End: 2023-12-18
Payer: COMMERCIAL

## 2023-12-22 NOTE — TELEPHONE ENCOUNTER
Forms signed. Copy sent to scanning.     [employer] message sent asking how patient would like to have forms returned to her.     Forms placed at [facility] [employer] desk.

## 2024-01-31 ENCOUNTER — TELEPHONE (OUTPATIENT)
Dept: FAMILY MEDICINE | Facility: CLINIC | Age: 34
End: 2024-01-31
Payer: COMMERCIAL

## 2024-01-31 NOTE — TELEPHONE ENCOUNTER
Patient Quality Outreach    Patient is due for the following:   Depression  -  PHQ-9 needed  Physical Preventive Adult Physical    Next Steps:   Schedule a Adult Preventative    Type of outreach:    Sent [employer] message.    Next Steps:  Reach out within 90 days via [employer].    Max number of attempts reached: No. Will try again in 90 days if patient still on fail list.    Questions for provider review:    None           Carol Whiteside, [facility]  Chart routed to Care Team.

## 2024-05-13 ENCOUNTER — TELEPHONE (OUTPATIENT)
Dept: FAMILY MEDICINE | Facility: CLINIC | Age: 34
End: 2024-05-13
Payer: COMMERCIAL

## 2024-05-13 NOTE — TELEPHONE ENCOUNTER
Patient Quality Outreach    Patient is due for the following:   Depression  -  PHQ-9 needed  Physical Preventive Adult Physical    Next Steps:   Schedule a Adult Preventative  Patient was assigned appropriate questionnaire to complete    Type of outreach:    Sent [employer] message.      Questions for provider review:    None           Bailee Kahler

## 2024-11-03 ENCOUNTER — HEALTH MAINTENANCE LETTER (OUTPATIENT)
Age: 34
End: 2024-11-03

## 2025-01-17 SDOH — HEALTH STABILITY: PHYSICAL HEALTH: ON AVERAGE, HOW MANY DAYS PER WEEK DO YOU ENGAGE IN MODERATE TO STRENUOUS EXERCISE (LIKE A BRISK WALK)?: 1 DAY

## 2025-01-17 SDOH — HEALTH STABILITY: PHYSICAL HEALTH: ON AVERAGE, HOW MANY MINUTES DO YOU ENGAGE IN EXERCISE AT THIS LEVEL?: 10 MIN

## 2025-01-17 ASSESSMENT — ANXIETY QUESTIONNAIRES
3. WORRYING TOO MUCH ABOUT DIFFERENT THINGS: SEVERAL DAYS
GAD7 TOTAL SCORE: 8
7. FEELING AFRAID AS IF SOMETHING AWFUL MIGHT HAPPEN: NEARLY EVERY DAY
GAD7 TOTAL SCORE: 8
1. FEELING NERVOUS, ANXIOUS, OR ON EDGE: SEVERAL DAYS
6. BECOMING EASILY ANNOYED OR IRRITABLE: SEVERAL DAYS
5. BEING SO RESTLESS THAT IT IS HARD TO SIT STILL: NOT AT ALL
8. IF YOU CHECKED OFF ANY PROBLEMS, HOW DIFFICULT HAVE THESE MADE IT FOR YOU TO DO YOUR WORK, TAKE CARE OF THINGS AT HOME, OR GET ALONG WITH OTHER PEOPLE?: SOMEWHAT DIFFICULT
7. FEELING AFRAID AS IF SOMETHING AWFUL MIGHT HAPPEN: NEARLY EVERY DAY
IF YOU CHECKED OFF ANY PROBLEMS ON THIS QUESTIONNAIRE, HOW DIFFICULT HAVE THESE PROBLEMS MADE IT FOR YOU TO DO YOUR WORK, TAKE CARE OF THINGS AT HOME, OR GET ALONG WITH OTHER PEOPLE: SOMEWHAT DIFFICULT
4. TROUBLE RELAXING: SEVERAL DAYS
2. NOT BEING ABLE TO STOP OR CONTROL WORRYING: SEVERAL DAYS
GAD7 TOTAL SCORE: 8

## 2025-01-17 ASSESSMENT — SOCIAL DETERMINANTS OF HEALTH (SDOH): HOW OFTEN DO YOU GET TOGETHER WITH FRIENDS OR RELATIVES?: NEVER

## 2025-01-19 ASSESSMENT — PATIENT HEALTH QUESTIONNAIRE - PHQ9
10. IF YOU CHECKED OFF ANY PROBLEMS, HOW DIFFICULT HAVE THESE PROBLEMS MADE IT FOR YOU TO DO YOUR WORK, TAKE CARE OF THINGS AT HOME, OR GET ALONG WITH OTHER PEOPLE: SOMEWHAT DIFFICULT
SUM OF ALL RESPONSES TO PHQ QUESTIONS 1-9: 10
SUM OF ALL RESPONSES TO PHQ QUESTIONS 1-9: 10

## 2025-01-20 ENCOUNTER — OFFICE VISIT (OUTPATIENT)
Dept: FAMILY MEDICINE | Facility: CLINIC | Age: 35
End: 2025-01-20
Payer: COMMERCIAL

## 2025-01-20 VITALS
WEIGHT: 293 LBS | TEMPERATURE: 96.7 F | DIASTOLIC BLOOD PRESSURE: 80 MMHG | OXYGEN SATURATION: 95 % | RESPIRATION RATE: 22 BRPM | SYSTOLIC BLOOD PRESSURE: 118 MMHG | HEIGHT: 68 IN | HEART RATE: 99 BPM | BODY MASS INDEX: 44.41 KG/M2

## 2025-01-20 DIAGNOSIS — Z00.00 ROUTINE GENERAL MEDICAL EXAMINATION AT A HEALTH CARE FACILITY: Primary | ICD-10-CM

## 2025-01-20 DIAGNOSIS — E55.9 VITAMIN D DEFICIENCY: ICD-10-CM

## 2025-01-20 DIAGNOSIS — E53.8 VITAMIN B12 DEFICIENCY (NON ANEMIC): ICD-10-CM

## 2025-01-20 DIAGNOSIS — F32.0 MILD MAJOR DEPRESSION: ICD-10-CM

## 2025-01-20 DIAGNOSIS — E66.01 CLASS 3 SEVERE OBESITY DUE TO EXCESS CALORIES WITHOUT SERIOUS COMORBIDITY WITH BODY MASS INDEX (BMI) OF 50.0 TO 59.9 IN ADULT (H): ICD-10-CM

## 2025-01-20 DIAGNOSIS — Z13.6 CARDIOVASCULAR SCREENING; LDL GOAL LESS THAN 100: ICD-10-CM

## 2025-01-20 DIAGNOSIS — E66.813 CLASS 3 SEVERE OBESITY DUE TO EXCESS CALORIES WITHOUT SERIOUS COMORBIDITY WITH BODY MASS INDEX (BMI) OF 50.0 TO 59.9 IN ADULT (H): ICD-10-CM

## 2025-01-20 LAB
ANION GAP SERPL CALCULATED.3IONS-SCNC: 13 MMOL/L (ref 7–15)
BUN SERPL-MCNC: 13.9 MG/DL (ref 6–20)
CALCIUM SERPL-MCNC: 9.1 MG/DL (ref 8.8–10.4)
CHLORIDE SERPL-SCNC: 103 MMOL/L (ref 98–107)
CHOLEST SERPL-MCNC: 158 MG/DL
CREAT SERPL-MCNC: 1.05 MG/DL (ref 0.51–0.95)
EGFRCR SERPLBLD CKD-EPI 2021: 70 ML/MIN/1.73M2
FASTING STATUS PATIENT QL REPORTED: YES
FASTING STATUS PATIENT QL REPORTED: YES
GLUCOSE SERPL-MCNC: 103 MG/DL (ref 70–99)
HCO3 SERPL-SCNC: 23 MMOL/L (ref 22–29)
HDLC SERPL-MCNC: 34 MG/DL
LDLC SERPL CALC-MCNC: 95 MG/DL
NONHDLC SERPL-MCNC: 124 MG/DL
POTASSIUM SERPL-SCNC: 4.3 MMOL/L (ref 3.4–5.3)
SODIUM SERPL-SCNC: 139 MMOL/L (ref 135–145)
TRIGL SERPL-MCNC: 146 MG/DL
VIT B12 SERPL-MCNC: 208 PG/ML (ref 232–1245)
VIT D+METAB SERPL-MCNC: 18 NG/ML (ref 20–50)

## 2025-01-20 PROCEDURE — 80061 LIPID PANEL: CPT | Performed by: NURSE PRACTITIONER

## 2025-01-20 PROCEDURE — 82607 VITAMIN B-12: CPT | Performed by: NURSE PRACTITIONER

## 2025-01-20 PROCEDURE — 99395 PREV VISIT EST AGE 18-39: CPT | Performed by: NURSE PRACTITIONER

## 2025-01-20 PROCEDURE — 36415 COLL VENOUS BLD VENIPUNCTURE: CPT | Performed by: NURSE PRACTITIONER

## 2025-01-20 PROCEDURE — 82306 VITAMIN D 25 HYDROXY: CPT | Performed by: NURSE PRACTITIONER

## 2025-01-20 PROCEDURE — 99214 OFFICE O/P EST MOD 30 MIN: CPT | Mod: 25 | Performed by: NURSE PRACTITIONER

## 2025-01-20 PROCEDURE — 80048 BASIC METABOLIC PNL TOTAL CA: CPT | Performed by: NURSE PRACTITIONER

## 2025-01-20 RX ORDER — BUPROPION HYDROCHLORIDE 150 MG/1
150 TABLET ORAL EVERY MORNING
Qty: 90 TABLET | Refills: 0 | Status: SHIPPED | OUTPATIENT
Start: 2025-01-20

## 2025-01-20 RX ORDER — NALTREXONE HYDROCHLORIDE 50 MG/1
TABLET, FILM COATED ORAL
Qty: 90 TABLET | Refills: 0 | Status: SHIPPED | OUTPATIENT
Start: 2025-01-20

## 2025-01-20 ASSESSMENT — PAIN SCALES - GENERAL: PAINLEVEL_OUTOF10: NO PAIN (0)

## 2025-01-20 NOTE — PATIENT INSTRUCTIONS
Mild major depression  Slightly uncontrolled.  Has been off medications for several months due to insurance.  Difficulty with motivation, weight is up from previous.  Previously on Wellbutrin and tolerated well.  Will restart patient to follow-up via virtual visit approximate 4 to 6 weeks for recheck.  May consider increasing dose if needed.  - buPROPion (WELLBUTRIN XL) 150 MG 24 hr tablet; Take 1 tablet (150 mg) by mouth every morning.    Class 3 severe obesity due to excess calories without serious comorbidity with body mass index (BMI) of 50.0 to 59.9 in adult (H)  Chronic, uncontrolled.  Weight is back up as she has been off of medications for several months.  Depression not under good control which is also contributing.  Was previously on phentermine and topiramate and had very good results and then plateaued.  Established with weight management and started on naltrexone was doing well on this until stopped medications.  Discussed restarting naltrexone in addition to her Wellbutrin and getting back into see weight management.  Patient is in agreement.  Discussed diet and exercise as well, hoping to improve when moods are more stabilized.  - naltrexone (DEPADE/REVIA) 50 MG tablet; Start half a tablet daily with supper for 7 days then increase, if tolerated to half a tablet with breakfast and supper.  - Adult Comprehensive Weight Management  Referral; Future    Vitamin D deficiency  Previous history of vitamin D deficiency, will recheck today.  - Vitamin D Deficiency; Future    Vitamin B12 deficiency (non anemic)  Previous history of vitamin B12 deficiency, was taking apparently currently not taking any.  Will recheck levels.  - Vitamin B12; Future    CARDIOVASCULAR SCREENING; LDL GOAL LESS THAN 100  Patient is fasting today, will check cholesterol levels.  - Lipid Profile (Chol, Trig, HDL, LDL calc); Future        Patient Education   Preventive Care Advice   This is general advice given by our system to  help you stay healthy. However, your care team may have specific advice just for you. Please talk to your care team about your preventive care needs.  Nutrition  Eat 5 or more servings of fruits and vegetables each day.  Try wheat bread, brown rice and whole grain pasta (instead of white bread, rice, and pasta).  Get enough calcium and vitamin D. Check the label on foods and aim for 100% of the RDA (recommended daily allowance).  Lifestyle  Exercise at least 150 minutes each week  (30 minutes a day, 5 days a week).  Do muscle strengthening activities 2 days a week. These help control your weight and prevent disease.  No smoking.  Wear sunscreen to prevent skin cancer.  Have a dental exam and cleaning every 6 months.  Yearly exams  See your health care team every year to talk about:  Any changes in your health.  Any medicines your care team has prescribed.  Preventive care, family planning, and ways to prevent chronic diseases.  Shots (vaccines)   HPV shots (up to age 26), if you've never had them before.  Hepatitis B shots (up to age 59), if you've never had them before.  COVID-19 shot: Get this shot when it's due.  Flu shot: Get a flu shot every year.  Tetanus shot: Get a tetanus shot every 10 years.  Pneumococcal, hepatitis A, and RSV shots: Ask your care team if you need these based on your risk.  Shingles shot (for age 50 and up)  General health tests  Diabetes screening:  Starting at age 35, Get screened for diabetes at least every 3 years.  If you are younger than age 35, ask your care team if you should be screened for diabetes.  Cholesterol test: At age 39, start having a cholesterol test every 5 years, or more often if advised.  Bone density scan (DEXA): At age 50, ask your care team if you should have this scan for osteoporosis (brittle bones).  Hepatitis C: Get tested at least once in your life.  STIs (sexually transmitted infections)  Before age 24: Ask your care team if you should be screened for  STIs.  After age 24: Get screened for STIs if you're at risk. You are at risk for STIs (including HIV) if:  You are sexually active with more than one person.  You don't use condoms every time.  You or a partner was diagnosed with a sexually transmitted infection.  If you are at risk for HIV, ask about PrEP medicine to prevent HIV.  Get tested for HIV at least once in your life, whether you are at risk for HIV or not.  Cancer screening tests  Cervical cancer screening: If you have a cervix, begin getting regular cervical cancer screening tests starting at age 21.  Breast cancer scan (mammogram): If you've ever had breasts, begin having regular mammograms starting at age 40. This is a scan to check for breast cancer.  Colon cancer screening: It is important to start screening for colon cancer at age 45.  Have a colonoscopy test every 10 years (or more often if you're at risk) Or, ask your provider about stool tests like a FIT test every year or Cologuard test every 3 years.  To learn more about your testing options, visit:   .  For help making a decision, visit:   https://bit.ly/ym37776.  Prostate cancer screening test: If you have a prostate, ask your care team if a prostate cancer screening test (PSA) at age 55 is right for you.  Lung cancer screening: If you are a current or former smoker ages 50 to 80, ask your care team if ongoing lung cancer screenings are right for you.  For informational purposes only. Not to replace the advice of your health care provider. Copyright   2023 [facility]. All rights reserved. Clinically reviewed by the [facility] Transitions Program. [employer] 709156 - REV 01/24.  Learning About Stress  What is stress?     Stress is your body's response to a hard situation. Your body can have a physical, emotional, or mental response. Stress is a fact of life for most people, and it affects everyone differently. What causes stress for you may not be stressful for someone  else.  A lot of things can cause stress. You may feel stress when you go on a job interview, take a test, or run a race. This kind of short-term stress is normal and even useful. It can help you if you need to work hard or react quickly. For example, stress can help you finish an important job on time.  Long-term stress is caused by ongoing stressful situations or events. Examples of long-term stress include long-term health problems, ongoing problems at work, or conflicts in your family. Long-term stress can harm your health.  How does stress affect your health?  When you are stressed, your body responds as though you are in danger. It makes hormones that speed up your heart, make you breathe faster, and give you a burst of energy. This is called the fight-or-flight stress response. If the stress is over quickly, your body goes back to normal and no harm is done.  But if stress happens too often or lasts too long, it can have bad effects. Long-term stress can make you more likely to get sick, and it can make symptoms of some diseases worse. If you tense up when you are stressed, you may develop neck, shoulder, or low back pain. Stress is linked to high blood pressure and heart disease.  Stress also harms your emotional health. It can make you huizar, tense, or depressed. Your relationships may suffer, and you may not do well at work or school.  What can you do to manage stress?  You can try these things to help manage stress:   Do something active. Exercise or activity can help reduce stress. Walking is a great way to get started. Even everyday activities such as housecleaning or yard work can help.  Try yoga or jourdan chi. These techniques combine exercise and meditation. You may need some training at first to learn them.  Do something you enjoy. For example, listen to music or go to a movie. Practice your hobby or do volunteer work.  Meditate. This can help you relax, because you are not worrying about what happened  "before or what may happen in the future.  Do guided imagery. Imagine yourself in any setting that helps you feel calm. You can use online videos, books, or a teacher to guide you.  Do breathing exercises. For example:  From a standing position, bend forward from the waist with your knees slightly bent. Let your arms dangle close to the floor.  Breathe in slowly and deeply as you return to a standing position. Roll up slowly and lift your head last.  Hold your breath for just a few seconds in the standing position.  Breathe out slowly and bend forward from the waist.  Let your feelings out. Talk, laugh, cry, and express anger when you need to. Talking with supportive friends or family, a counselor, or a millicent leader about your feelings is a healthy way to relieve stress. Avoid discussing your feelings with people who make you feel worse.  Write. It may help to write about things that are bothering you. This helps you find out how much stress you feel and what is causing it. When you know this, you can find better ways to cope.  What can you do to prevent stress?  You might try some of these things to help prevent stress:  Manage your time. This helps you find time to do the things you want and need to do.  Get enough sleep. Your body recovers from the stresses of the day while you are sleeping.  Get support. Your family, friends, and community can make a difference in how you experience stress.  Limit your news feed. Avoid or limit time on social media or news that may make you feel stressed.  Do something active. Exercise or activity can help reduce stress. Walking is a great way to get started.  Where can you learn more?  Go to https://www.[employer].net/patiented  Enter N032 in the search box to learn more about \"Learning About Stress.\"  Current as of: October 24, 2023  Content Version: 14.3    2024 [facility] [employer].   Care instructions adapted under license by your healthcare professional. If you have " questions about a medical condition or this instruction, always ask your healthcare professional. [employer] disclaims any warranty or liability for your use of this information.    Learning About Depression Screening  What is depression screening?  Depression screening is a way to see if you have depression symptoms. It may be done by a doctor or counselor. It's often part of a routine checkup. That's because your mental health is just as important as your physical health.  Depression is a mental health condition that affects how you feel, think, and act. You may:  Have less energy.  Lose interest in your daily activities.  Feel sad and grouchy for a long time.  Depression is very common. It affects people of all ages.  Many things can lead to depression. Some people become depressed after they have a stroke or find out they have a major illness like cancer or heart disease. The death of a loved one or a breakup may lead to depression. It can run in families. Most experts believe that a combination of inherited genes and stressful life events can cause it.  What happens during screening?  You may be asked to fill out a form about your depression symptoms. You and the doctor will discuss your answers. The doctor may ask you more questions to learn more about how you think, act, and feel.  What happens after screening?  If you have symptoms of depression, your doctor will talk to you about your options.  Doctors usually treat depression with medicines or counseling. Often, combining the two works best. Many people don't get help because they think that they'll get over the depression on their own. But people with depression may not get better unless they get treatment.  The cause of depression is not well understood. There may be many factors involved. But if you have depression, it's not your fault.  A serious symptom of depression is thinking about death or suicide. If you or someone you care about  "talks about this or about feeling hopeless, get help right away.  It's important to know that depression can be treated. Medicine, counseling, and self-care may help.  Where can you learn more?  Go to https://www."[employer]net/patiented  Enter T185 in the search box to learn more about \"Learning About Depression Screening.\"  Current as of: July 31, 2024  Content Version: 14.3    2024 [employer].   Care instructions adapted under license by your healthcare professional. If you have questions about a medical condition or this instruction, always ask your healthcare professional. [employer] disclaims any warranty or liability for your use of this information.       "

## 2025-01-20 NOTE — PROGRESS NOTES
Preventive Care Visit  [facility]  Ivanna Montaño DNP, Family Medicine  Jan 20, 2025      Assessment & Plan     Routine general medical examination at a health care facility  Pleasant 34-year-old female in for annual examination.  - Basic metabolic panel  (Ca, Cl, CO2, Creat, Gluc, K, Na, BUN); Future    Mild major depression  Slightly uncontrolled.  Has been off medications for several months due to insurance.  Difficulty with motivation, weight is up from previous.  Previously on Wellbutrin and tolerated well.  Will restart patient to follow-up via virtual visit approximate 4 to 6 weeks for recheck.  May consider increasing dose if needed.  - buPROPion (WELLBUTRIN XL) 150 MG 24 hr tablet; Take 1 tablet (150 mg) by mouth every morning.    Class 3 severe obesity due to excess calories without serious comorbidity with body mass index (BMI) of 50.0 to 59.9 in adult (H)  Chronic, uncontrolled.  Weight is back up as she has been off of medications for several months.  Depression not under good control which is also contributing.  Was previously on phentermine and topiramate and had very good results and then plateaued.  Established with weight management and started on naltrexone was doing well on this until stopped medications.  Discussed restarting naltrexone in addition to her Wellbutrin and getting back into see weight management.  Patient is in agreement.  Discussed diet and exercise as well, hoping to improve when moods are more stabilized.  - naltrexone (DEPADE/REVIA) 50 MG tablet; Start half a tablet daily with supper for 7 days then increase, if tolerated to half a tablet with breakfast and supper.  - Adult Comprehensive Weight Management  Referral; Future    Vitamin D deficiency  Previous history of vitamin D deficiency, will recheck today.  - Vitamin D Deficiency; Future    Vitamin B12 deficiency (non anemic)  Previous history of vitamin B12 deficiency, was taking  "apparently currently not taking any.  Will recheck levels.  - Vitamin B12; Future    CARDIOVASCULAR SCREENING; LDL GOAL LESS THAN 100  Patient is fasting today, will check cholesterol levels.  - Lipid Profile (Chol, Trig, HDL, LDL calc); Future    Patient has been advised of split billing requirements and indicates understanding: Yes        BMI  Estimated body mass index is 59.66 kg/m  as calculated from the following:    Height as of this encounter: 1.727 m (5' 8\").    Weight as of this encounter: 178 kg (392 lb 6.4 oz).   Weight management plan: Patient referred to endocrine and/or weight management specialty Discussed healthy diet and exercise guidelines and restarted medication    Wt Readings from Last 3 Encounters:   01/20/25 (!) 178 kg (392 lb 6.4 oz)   12/14/23 145.1 kg (319 lb 12.8 oz)   10/16/23 (!) 151.7 kg (334 lb 8 oz)          Counseling  Appropriate preventive services were addressed with this patient via screening, questionnaire, or discussion as appropriate for fall prevention, nutrition, physical activity, Tobacco-use cessation, social engagement, weight loss and cognition.  Checklist reviewing preventive services available has been given to the patient.  Reviewed patient's diet, addressing concerns and/or questions.   She is at risk for lack of exercise and has been provided with information to increase physical activity for the benefit of her well-being.   Patient is at risk for social isolation and has been provided with information about the benefit of social connection.   The patient was instructed to see the dentist every 6 months.   She is at risk for psychosocial distress and has been provided with information to reduce risk.   The patient's PHQ-9 score is consistent with moderate depression. She was provided with information regarding depression.       See Patient Instructions    Phoebe Dennis is a 34 year old, presenting for the following:  Physical      Has been off of medications " for several months  Feels depression more than anxiety  Really liked the weight management provider, weight is back up          1/20/2025     7:57 AM   Additional Questions   Roomed by ANNI Holloway        Health Care Directive  Patient does not have a Health Care Directive: Discussed advance care planning with patient; however, patient declined at this time.      1/17/2025   General Health   How would you rate your overall physical health? (!) FAIR   Feel stress (tense, anxious, or unable to sleep) Very much   (!) STRESS CONCERN      1/17/2025   Nutrition   Three or more servings of calcium each day? Yes   Diet: Regular (no restrictions)   How many servings of fruit and vegetables per day? (!) 2-3   How many sweetened beverages each day? 0-1         1/17/2025   Exercise   Days per week of moderate/strenous exercise 1 day   Average minutes spent exercising at this level 10 min   (!) EXERCISE CONCERN      1/17/2025   Social Factors   Frequency of gathering with friends or relatives Never   Worry food won't last until get money to buy more No   Food not last or not have enough money for food? No   Do you have housing? (Housing is defined as stable permanent housing and does not include staying ouside in a car, in a tent, in an abandoned building, in an overnight shelter, or couch-surfing.) Yes   Are you worried about losing your housing? No   Lack of transportation? No   Unable to get utilities (heat,electricity)? No   (!) SOCIAL CONNECTIONS CONCERN      1/17/2025   Dental   Dentist two times every year? (!) NO         1/17/2025   TB Screening   Were you born outside of the [place]? No       Today's PHQ-9 Score:       1/19/2025     9:34 AM   PHQ-9 SCORE   PHQ-9 Total Score MyChart 10 (Moderate depression)   PHQ-9 Total Score 10        Patient-reported         1/17/2025   Substance Use   Alcohol more than 3/day or more than 7/wk No   Do you use any other substances recreationally? No     Social History  "    Tobacco Use    Smoking status: Never     Passive exposure: Never    Smokeless tobacco: Never   Vaping Use    Vaping status: Never Used   Substance Use Topics    Alcohol use: No    Drug use: No          Mammogram Screening - Patient under 40 years of age: Routine Mammogram Screening not recommended.         1/17/2025   STI Screening   New sexual partner(s) since last STI/HIV test? No     History of abnormal Pap smear: No - age 30- 64 PAP with HPV every 5 years recommended        Latest Ref Rng & Units 5/21/2021     5:14 PM 5/21/2021     4:45 PM   PAP / HPV   PAP (Historical)  NIL     HPV 16 DNA NEG^Negative  Negative    HPV 18 DNA NEG^Negative  Negative    Other HR HPV NEG^Negative  Negative            1/17/2025   Contraception/Family Planning   Questions about contraception or family planning No        Reviewed and updated as needed this visit by Provider                    Past Medical History:   Diagnosis Date    Depressive disorder Teenage years    Currently taking Lexapro for depression and anxiety    Mild major depression 04/21/2021    Morbid obesity (H) 04/21/2021    FABIAN (obstructive sleep apnea) 01/12/2022     Past Surgical History:   Procedure Laterality Date    SURGICAL HISTORY OF -       None    [facility] SLEEP STUDY, UNATTENDED, RECORD HEART RATE/O2 SAT/RESP ANAL/SLEEP TM  9/29/2021              Review of Systems  Constitutional, neuro, ENT, endocrine, pulmonary, cardiac, gastrointestinal, genitourinary, musculoskeletal, integument and psychiatric systems are negative, except as otherwise noted.     Objective    Exam  /80 (BP Location: Right arm, Patient Position: Sitting, Cuff Size: Adult Large)   Pulse 99   Temp (!) 96.7  F (35.9  C) (Tympanic)   Resp 22   Ht 1.727 m (5' 8\")   Wt (!) 178 kg (392 lb 6.4 oz)   LMP  (LMP Unknown)   SpO2 95%   BMI 59.66 kg/m     Estimated body mass index is 59.66 kg/m  as calculated from the following:    Height as of this encounter: 1.727 m (5' 8\").    Weight " as of this encounter: 178 kg (392 lb 6.4 oz).    Physical Exam  GENERAL: alert and no distress  EYES: Eyes grossly normal to inspection, PERRL and conjunctivae and sclerae normal  HENT: ear canals and TM's normal, nose and mouth without ulcers or lesions  NECK: no adenopathy, no asymmetry, masses, or scars  RESP: lungs clear to auscultation - no rales, rhonchi or wheezes  BREAST: normal without masses, tenderness or nipple discharge and no palpable axillary masses or adenopathy  CV: regular rate and rhythm, normal S1 S2, no S3 or S4, no murmur, click or rub, no peripheral edema  ABDOMEN: soft, nontender, no hepatosplenomegaly, no masses and bowel sounds normal  MS: no gross musculoskeletal defects noted, no edema  SKIN: no suspicious lesions or rashes  NEURO: Normal strength and tone, mentation intact and speech normal  PSYCH: mentation appears normal, affect normal/bright        Signed Electronically by: Ivanna Montaño DNP    Answers submitted by the patient for this visit:  Patient Health Questionnaire (Submitted on 1/19/2025)  If you checked off any problems, how difficult have these problems made it for you to do your work, take care of things at home, or get along with other people?: Somewhat difficult  PHQ9 TOTAL SCORE: 10  Patient Health Questionnaire (G7) (Submitted on 1/17/2025)  MAG 7 TOTAL SCORE: 8    Chart documentation with Dragon Voice recognition Software. Although reviewed after completion, some words and grammatical errors may remain.

## 2025-01-23 DIAGNOSIS — R73.01 ELEVATED FASTING GLUCOSE: Primary | ICD-10-CM

## 2025-01-23 DIAGNOSIS — E55.9 VITAMIN D DEFICIENCY: ICD-10-CM

## 2025-01-27 ENCOUNTER — LAB (OUTPATIENT)
Dept: LAB | Facility: CLINIC | Age: 35
End: 2025-01-27
Payer: COMMERCIAL

## 2025-01-27 DIAGNOSIS — R73.01 ELEVATED FASTING GLUCOSE: ICD-10-CM

## 2025-01-27 LAB
EST. AVERAGE GLUCOSE BLD GHB EST-MCNC: 105 MG/DL
HBA1C MFR BLD: 5.3 % (ref 0–5.6)

## 2025-01-27 PROCEDURE — 83036 HEMOGLOBIN GLYCOSYLATED A1C: CPT

## 2025-01-27 PROCEDURE — 36415 COLL VENOUS BLD VENIPUNCTURE: CPT

## 2025-02-17 ENCOUNTER — TELEPHONE (OUTPATIENT)
Dept: FAMILY MEDICINE | Facility: CLINIC | Age: 35
End: 2025-02-17
Payer: COMMERCIAL

## 2025-02-17 NOTE — TELEPHONE ENCOUNTER
Reason for Call:  Appointment Request    Patient requesting this type of appt:  Preventive     Requested provider: Ivanna Kinney    Reason patient unable to be scheduled: Not within requested timeframe    When does patient want to be seen/preferred time: 1-2 days    Comments: Pt requesting change of appt, there was a scheduling error, looks like their appt was set up for 02/18 in 2026, not for 2025 - the current year. Please call pt back to confirm if pt can fit them in for 02/18/2025     Could we send this information to you in [employer] or would you prefer to receive a phone call?:   Patient would prefer a phone call   Okay to leave a detailed message?: Yes at Cell number on file:    Telephone Information:   Mobile [phone]       Call taken on 2/17/2025 at 9:11 AM by Ruby Acosta

## 2025-03-07 ASSESSMENT — ANXIETY QUESTIONNAIRES
8. IF YOU CHECKED OFF ANY PROBLEMS, HOW DIFFICULT HAVE THESE MADE IT FOR YOU TO DO YOUR WORK, TAKE CARE OF THINGS AT HOME, OR GET ALONG WITH OTHER PEOPLE?: EXTREMELY DIFFICULT
4. TROUBLE RELAXING: MORE THAN HALF THE DAYS
7. FEELING AFRAID AS IF SOMETHING AWFUL MIGHT HAPPEN: NEARLY EVERY DAY
5. BEING SO RESTLESS THAT IT IS HARD TO SIT STILL: NOT AT ALL
GAD7 TOTAL SCORE: 15
2. NOT BEING ABLE TO STOP OR CONTROL WORRYING: NEARLY EVERY DAY
7. FEELING AFRAID AS IF SOMETHING AWFUL MIGHT HAPPEN: NEARLY EVERY DAY
IF YOU CHECKED OFF ANY PROBLEMS ON THIS QUESTIONNAIRE, HOW DIFFICULT HAVE THESE PROBLEMS MADE IT FOR YOU TO DO YOUR WORK, TAKE CARE OF THINGS AT HOME, OR GET ALONG WITH OTHER PEOPLE: EXTREMELY DIFFICULT
GAD7 TOTAL SCORE: 15
3. WORRYING TOO MUCH ABOUT DIFFERENT THINGS: MORE THAN HALF THE DAYS
1. FEELING NERVOUS, ANXIOUS, OR ON EDGE: NEARLY EVERY DAY
GAD7 TOTAL SCORE: 15
6. BECOMING EASILY ANNOYED OR IRRITABLE: MORE THAN HALF THE DAYS

## 2025-03-10 ENCOUNTER — OFFICE VISIT (OUTPATIENT)
Dept: FAMILY MEDICINE | Facility: CLINIC | Age: 35
End: 2025-03-10
Payer: COMMERCIAL

## 2025-03-10 VITALS
DIASTOLIC BLOOD PRESSURE: 78 MMHG | TEMPERATURE: 98.5 F | HEIGHT: 68 IN | BODY MASS INDEX: 44.41 KG/M2 | HEART RATE: 94 BPM | RESPIRATION RATE: 22 BRPM | OXYGEN SATURATION: 95 % | SYSTOLIC BLOOD PRESSURE: 118 MMHG | WEIGHT: 293 LBS

## 2025-03-10 DIAGNOSIS — E66.01 CLASS 3 SEVERE OBESITY DUE TO EXCESS CALORIES WITHOUT SERIOUS COMORBIDITY WITH BODY MASS INDEX (BMI) OF 60.0 TO 69.9 IN ADULT (H): Primary | ICD-10-CM

## 2025-03-10 DIAGNOSIS — E66.813 CLASS 3 SEVERE OBESITY DUE TO EXCESS CALORIES WITHOUT SERIOUS COMORBIDITY WITH BODY MASS INDEX (BMI) OF 60.0 TO 69.9 IN ADULT (H): Primary | ICD-10-CM

## 2025-03-10 DIAGNOSIS — F32.0 MILD MAJOR DEPRESSION: ICD-10-CM

## 2025-03-10 PROCEDURE — 1126F AMNT PAIN NOTED NONE PRSNT: CPT | Performed by: NURSE PRACTITIONER

## 2025-03-10 PROCEDURE — 3078F DIAST BP <80 MM HG: CPT | Performed by: NURSE PRACTITIONER

## 2025-03-10 PROCEDURE — 99214 OFFICE O/P EST MOD 30 MIN: CPT | Performed by: NURSE PRACTITIONER

## 2025-03-10 PROCEDURE — 3074F SYST BP LT 130 MM HG: CPT | Performed by: NURSE PRACTITIONER

## 2025-03-10 RX ORDER — BUPROPION HYDROCHLORIDE 300 MG/1
300 TABLET ORAL EVERY MORNING
Qty: 90 TABLET | Refills: 0 | Status: SHIPPED | OUTPATIENT
Start: 2025-03-10

## 2025-03-10 ASSESSMENT — PAIN SCALES - GENERAL: PAINLEVEL_OUTOF10: NO PAIN (0)

## 2025-03-10 ASSESSMENT — PATIENT HEALTH QUESTIONNAIRE - PHQ9
SUM OF ALL RESPONSES TO PHQ QUESTIONS 1-9: 20
SUM OF ALL RESPONSES TO PHQ QUESTIONS 1-9: 20
10. IF YOU CHECKED OFF ANY PROBLEMS, HOW DIFFICULT HAVE THESE PROBLEMS MADE IT FOR YOU TO DO YOUR WORK, TAKE CARE OF THINGS AT HOME, OR GET ALONG WITH OTHER PEOPLE: SOMEWHAT DIFFICULT

## 2025-03-10 NOTE — PATIENT INSTRUCTIONS
Mild major depression  Chronic, uncontrolled.  Worsening.  Effects of multiple things including weight, political issues, climate change and overall health.  Discussed counseling at length, did highly encourage.  Patient will look into this.  Feels it has not been helpful in the past.  Discussed working on looking at the positives out of her day, think she is grateful for and journaling.  Also discussed patient thinking about her purpose in writing this down as well as some small goals.  Will increase Wellbutrin to 300 mg.  Patient to follow-up with provider in 1 month.  - buPROPion (WELLBUTRIN XL) 300 MG 24 hr tablet; Take 1 tablet (300 mg) by mouth every morning.    Class 3 severe obesity due to excess calories without serious comorbidity with body mass index (BMI) of 60.0 to 69.9 in adult (H)  Chronic, has had increased weight since last office visit.  Partially secondary to uncontrolled depression.  Did have previous success with weight loss on phentermine and topiramate.  Also previously saw weight management and started on naltrexone.  Just recently restarted this and is not seeming to help at all.  Patient very distraught over her weight.  Weight management referral placed last visit, patient has not scheduled yet.  Highly encourage patient to get this scheduled.  In the interim discussed starting Zepbound.  Discussed side effects and administration.  Discussed diet and exercise with working on this slowly and setting small goals.  Patient to follow-up 1 month after starting.  - tirzepatide-weight management (ZEPBOUND) 2.5 MG/0.5ML vial; Inject 0.5 mLs (2.5 mg) subcutaneously once a week.

## 2025-03-10 NOTE — PROGRESS NOTES
Assessment & Plan     Mild major depression  Chronic, uncontrolled.  Worsening.  Effects of multiple things including weight, political issues, climate change and overall health.  Discussed counseling at length, did highly encourage.  Patient will look into this.  Feels it has not been helpful in the past.  Discussed working on looking at the positives out of her day, think she is grateful for and journaling.  Also discussed patient thinking about her purpose in writing this down as well as some small goals.  Will increase Wellbutrin to 300 mg.  Patient to follow-up with provider in 1 month.  - buPROPion (WELLBUTRIN XL) 300 MG 24 hr tablet; Take 1 tablet (300 mg) by mouth every morning.    Class 3 severe obesity due to excess calories without serious comorbidity with body mass index (BMI) of 60.0 to 69.9 in adult (H)  Chronic, has had increased weight since last office visit.  Partially secondary to uncontrolled depression.  Did have previous success with weight loss on phentermine and topiramate.  Also previously saw weight management and started on naltrexone.  Just recently restarted this and is not seeming to help at all.  Patient very distraught over her weight.  Weight management referral placed last visit, patient has not scheduled yet.  Highly encourage patient to get this scheduled.  In the interim discussed starting Zepbound.  Discussed side effects and administration.  Discussed diet and exercise with working on this slowly and setting small goals.  Patient to follow-up 1 month after starting.  - tirzepatide-weight management (ZEPBOUND) 2.5 MG/0.5ML vial; Inject 0.5 mLs (2.5 mg) subcutaneously once a week.          Depression Screening Follow Up        3/10/2025     7:18 AM   PHQ   PHQ-9 Total Score 20    Q9: Thoughts of better off dead/self-harm past 2 weeks Several days   F/U: Thoughts of suicide or self-harm No   F/U: Safety concerns No       Patient-reported         See Patient  "Instructions    Subjective   Jeannie is a 34 year old, presenting for the following health issues:  Depression and Anxiety        3/10/2025     7:20 AM   Additional Questions   Roomed by Beth GARCIA CMA     History of Present Illness       Mental Health Follow-up:  Patient presents to follow-up on Depression & Anxiety.Patient's depression since last visit has been:  Medium  The patient is not having other symptoms associated with depression.  Patient's anxiety since last visit has been:  Bad  The patient is not having other symptoms associated with anxiety.  Any significant life events: other  Patient is not feeling anxious or having panic attacks.  Patient has no concerns about alcohol or drug use.    Reason for visit:  Follow up Naltrexone-hasn't noticed any difference at all with appetite, doesn't feel like it helps her at all.    She eats 2-3 servings of fruits and vegetables daily.She consumes 0 sweetened beverage(s) daily.She exercises with enough effort to increase her heart rate 20 to 29 minutes per day.  She exercises with enough effort to increase her heart rate 3 or less days per week.   She is taking medications regularly.        Significant stressors around weight   Stressing about dying early due to weight and having difficulty losing  Depression significantly uncontrolled, feels Wellbutrin has not been helpful and has been in the past  Is in grad school as well which is likely contributing  Very tearful    Naltrexone not working at any of the doses     No active suicidal thoughts ~ passive, \"why she here\"    Wt Readings from Last 3 Encounters:   03/10/25 (!) 182 kg (401 lb 3.2 oz)   01/20/25 (!) 178 kg (392 lb 6.4 oz)   12/14/23 145.1 kg (319 lb 12.8 oz)              9/10/2023    12:41 PM 1/19/2025     9:34 AM 3/10/2025     7:18 AM   PHQ   PHQ-9 Total Score 10 10  20    Q9: Thoughts of better off dead/self-harm past 2 weeks Not at all Not at all Several days   F/U: Thoughts of suicide or self-harm   No " "  F/U: Safety concerns   No       Patient-reported         9/10/2023    12:42 PM 1/17/2025    11:11 AM 3/7/2025    10:26 AM   MAG-7 SCORE   Total Score 7 (mild anxiety) 8 (mild anxiety) 15 (severe anxiety)   Total Score 7 8  15        Patient-reported           Review of Systems  Constitutional, HEENT, cardiovascular, pulmonary, gi and gu systems are negative, except as otherwise noted.      Objective    /78 (BP Location: Right arm, Patient Position: Sitting, Cuff Size: Adult Large)   Pulse 94   Temp 98.5  F (36.9  C) (Tympanic)   Resp 22   Ht 1.727 m (5' 8\")   Wt (!) 182 kg (401 lb 3.2 oz)   LMP  (LMP Unknown)   SpO2 95%   BMI 61.00 kg/m    Body mass index is 61 kg/m .  Physical Exam   GENERAL: alert and no distress  RESP: lungs clear to auscultation - no rales, rhonchi or wheezes  CV: regular rate and rhythm, normal S1 S2, no S3 or S4, no murmur, click or rub, no peripheral edema  ABDOMEN: soft, nontender, no hepatosplenomegaly, no masses and bowel sounds normal and obese  MS: no gross musculoskeletal defects noted, no edema  SKIN: no suspicious lesions or rashes  NEURO: Normal strength and tone, mentation intact and speech normal  PSYCH: mentation appears normal, affect flat, and tearful    Diagnostic Test Results:  Labs reviewed in Epic  none          Signed Electronically by: Ivanna Montaño, BELLE      Chart documentation with Dragon Voice recognition Software. Although reviewed after completion, some words and grammatical errors may remain.   "

## 2025-03-11 DIAGNOSIS — E66.813 CLASS 3 SEVERE OBESITY DUE TO EXCESS CALORIES WITHOUT SERIOUS COMORBIDITY WITH BODY MASS INDEX (BMI) OF 60.0 TO 69.9 IN ADULT (H): ICD-10-CM

## 2025-03-11 DIAGNOSIS — E66.01 CLASS 3 SEVERE OBESITY DUE TO EXCESS CALORIES WITHOUT SERIOUS COMORBIDITY WITH BODY MASS INDEX (BMI) OF 60.0 TO 69.9 IN ADULT (H): ICD-10-CM

## 2025-03-11 NOTE — TELEPHONE ENCOUNTER
Please let her know this was sent to [facility] in [place] yesterday per her request. She will need to have [facility] call to transfer it if she wants it transferred there.     tirzepatide-weight management (ZEPBOUND) 2.5 MG/0.5ML vial 2 mL 0 3/10/2025 -- No   Sig - Route: Inject 0.5 mLs (2.5 mg) subcutaneously once a week. - Subcutaneous   Sent to pharmacy as: Tirzepatide-Weight Management 2.5 MG/0.5ML Subcutaneous Solution (ZEPBOUND)   Class: E-Prescribe   Order: [id number]   E-Prescribing Status: Receipt confirmed by pharmacy (3/10/2025  8:34 AM CDT)   Prior authorization: Closed - Other     Printout Tracking    External Result Report     Pharmacy    [id number] - [place], [place] - [address]

## 2025-03-12 DIAGNOSIS — E66.813 CLASS 3 SEVERE OBESITY DUE TO EXCESS CALORIES WITHOUT SERIOUS COMORBIDITY WITH BODY MASS INDEX (BMI) OF 60.0 TO 69.9 IN ADULT (H): ICD-10-CM

## 2025-03-12 DIAGNOSIS — E66.01 CLASS 3 SEVERE OBESITY DUE TO EXCESS CALORIES WITHOUT SERIOUS COMORBIDITY WITH BODY MASS INDEX (BMI) OF 60.0 TO 69.9 IN ADULT (H): ICD-10-CM

## 2025-03-12 NOTE — TELEPHONE ENCOUNTER
Received call from Patient.  States that she is supposed to start tirzepitide and her pharmacy does not have it.  Has called around and can not find it.  Has heard that [facility] might have it.  Would like prescription sent to [facility] pharmacy.    Augusto MORALEZ, Clinic RN   [facility]

## 2025-03-14 DIAGNOSIS — E66.813 CLASS 3 SEVERE OBESITY DUE TO EXCESS CALORIES WITHOUT SERIOUS COMORBIDITY WITH BODY MASS INDEX (BMI) OF 60.0 TO 69.9 IN ADULT (H): ICD-10-CM
